# Patient Record
Sex: FEMALE | Race: WHITE | NOT HISPANIC OR LATINO | URBAN - METROPOLITAN AREA
[De-identification: names, ages, dates, MRNs, and addresses within clinical notes are randomized per-mention and may not be internally consistent; named-entity substitution may affect disease eponyms.]

---

## 2017-01-31 NOTE — ASU PATIENT PROFILE, ADULT - PSH
H/O vein stripping    H/O: lung cancer  Lobectomy  Status post cataract extraction  left eye done 10/5/2016

## 2017-01-31 NOTE — ASU PATIENT PROFILE, ADULT - PMH
Breast cyst    Graves disease    Hypothyroid    Migraines    OP (osteoporosis)    Raynauds disease    Venous insufficiency

## 2017-02-01 ENCOUNTER — OUTPATIENT (OUTPATIENT)
Dept: OUTPATIENT SERVICES | Facility: HOSPITAL | Age: 67
LOS: 1 days | End: 2017-02-01
Payer: MEDICARE

## 2017-02-01 ENCOUNTER — TRANSCRIPTION ENCOUNTER (OUTPATIENT)
Age: 67
End: 2017-02-01

## 2017-02-01 VITALS
RESPIRATION RATE: 16 BRPM | OXYGEN SATURATION: 98 % | HEART RATE: 60 BPM | SYSTOLIC BLOOD PRESSURE: 132 MMHG | DIASTOLIC BLOOD PRESSURE: 68 MMHG

## 2017-02-01 VITALS
HEART RATE: 59 BPM | RESPIRATION RATE: 12 BRPM | TEMPERATURE: 97 F | WEIGHT: 151.68 LBS | DIASTOLIC BLOOD PRESSURE: 74 MMHG | HEIGHT: 64 IN | OXYGEN SATURATION: 99 % | SYSTOLIC BLOOD PRESSURE: 128 MMHG

## 2017-02-01 DIAGNOSIS — Z98.49 CATARACT EXTRACTION STATUS, UNSPECIFIED EYE: Chronic | ICD-10-CM

## 2017-02-01 DIAGNOSIS — Z85.118 PERSONAL HISTORY OF OTHER MALIGNANT NEOPLASM OF BRONCHUS AND LUNG: Chronic | ICD-10-CM

## 2017-02-01 DIAGNOSIS — H25.11 AGE-RELATED NUCLEAR CATARACT, RIGHT EYE: ICD-10-CM

## 2017-02-01 DIAGNOSIS — Z98.89 OTHER SPECIFIED POSTPROCEDURAL STATES: Chronic | ICD-10-CM

## 2017-02-01 PROCEDURE — 66984 XCAPSL CTRC RMVL W/O ECP: CPT | Mod: RT

## 2017-02-01 PROCEDURE — V2788: CPT

## 2017-02-01 NOTE — ASU DISCHARGE PLAN (ADULT/PEDIATRIC). - PT EDUC
other (specify)/Implant card (specify)/Intraocular lens implant (IOL) , Eye shield instructions and eye kit given to patient

## 2017-02-01 NOTE — ASU DISCHARGE PLAN (ADULT/PEDIATRIC). - NOTIFY
Pain not relieved by Medications/Persistent Nausea and Vomiting/Swelling that continues/Bleeding that does not stop/Fever greater than 101

## 2017-12-07 ENCOUNTER — APPOINTMENT (OUTPATIENT)
Dept: THORACIC SURGERY | Facility: CLINIC | Age: 67
End: 2017-12-07
Payer: MEDICARE

## 2017-12-07 VITALS
DIASTOLIC BLOOD PRESSURE: 80 MMHG | RESPIRATION RATE: 16 BRPM | OXYGEN SATURATION: 95 % | SYSTOLIC BLOOD PRESSURE: 160 MMHG | HEIGHT: 64 IN | WEIGHT: 152 LBS | BODY MASS INDEX: 25.95 KG/M2 | HEART RATE: 78 BPM

## 2017-12-07 DIAGNOSIS — Z86.39 PERSONAL HISTORY OF OTHER ENDOCRINE, NUTRITIONAL AND METABOLIC DISEASE: ICD-10-CM

## 2017-12-07 DIAGNOSIS — Z87.39 PERSONAL HISTORY OF OTHER DISEASES OF THE MUSCULOSKELETAL SYSTEM AND CONNECTIVE TISSUE: ICD-10-CM

## 2017-12-07 DIAGNOSIS — Z87.19 PERSONAL HISTORY OF OTHER DISEASES OF THE DIGESTIVE SYSTEM: ICD-10-CM

## 2017-12-07 DIAGNOSIS — C34.90 MALIGNANT NEOPLASM OF UNSPECIFIED PART OF UNSPECIFIED BRONCHUS OR LUNG: ICD-10-CM

## 2017-12-07 PROCEDURE — 99205 OFFICE O/P NEW HI 60 MIN: CPT

## 2017-12-29 ENCOUNTER — OUTPATIENT (OUTPATIENT)
Dept: OUTPATIENT SERVICES | Facility: HOSPITAL | Age: 67
LOS: 1 days | End: 2017-12-29
Payer: MEDICARE

## 2017-12-29 VITALS
HEART RATE: 72 BPM | HEIGHT: 64.25 IN | OXYGEN SATURATION: 97 % | SYSTOLIC BLOOD PRESSURE: 138 MMHG | RESPIRATION RATE: 16 BRPM | DIASTOLIC BLOOD PRESSURE: 78 MMHG | TEMPERATURE: 98 F | WEIGHT: 154.1 LBS

## 2017-12-29 DIAGNOSIS — R91.1 SOLITARY PULMONARY NODULE: ICD-10-CM

## 2017-12-29 DIAGNOSIS — Z98.49 CATARACT EXTRACTION STATUS, UNSPECIFIED EYE: Chronic | ICD-10-CM

## 2017-12-29 DIAGNOSIS — Z98.89 OTHER SPECIFIED POSTPROCEDURAL STATES: Chronic | ICD-10-CM

## 2017-12-29 DIAGNOSIS — Z91.040 LATEX ALLERGY STATUS: ICD-10-CM

## 2017-12-29 DIAGNOSIS — I10 ESSENTIAL (PRIMARY) HYPERTENSION: ICD-10-CM

## 2017-12-29 DIAGNOSIS — E03.9 HYPOTHYROIDISM, UNSPECIFIED: ICD-10-CM

## 2017-12-29 DIAGNOSIS — Z85.118 PERSONAL HISTORY OF OTHER MALIGNANT NEOPLASM OF BRONCHUS AND LUNG: Chronic | ICD-10-CM

## 2017-12-29 DIAGNOSIS — Z86.008 PERSONAL HISTORY OF IN-SITU NEOPLASM OF OTHER SITE: Chronic | ICD-10-CM

## 2017-12-29 DIAGNOSIS — L73.2 HIDRADENITIS SUPPURATIVA: Chronic | ICD-10-CM

## 2017-12-29 LAB
ALBUMIN SERPL ELPH-MCNC: 4 G/DL — SIGNIFICANT CHANGE UP (ref 3.3–5)
ALP SERPL-CCNC: 42 U/L — SIGNIFICANT CHANGE UP (ref 40–120)
ALT FLD-CCNC: 20 U/L — SIGNIFICANT CHANGE UP (ref 4–33)
AST SERPL-CCNC: 24 U/L — SIGNIFICANT CHANGE UP (ref 4–32)
BILIRUB SERPL-MCNC: 0.4 MG/DL — SIGNIFICANT CHANGE UP (ref 0.2–1.2)
BLD GP AB SCN SERPL QL: NEGATIVE — SIGNIFICANT CHANGE UP
BUN SERPL-MCNC: 18 MG/DL — SIGNIFICANT CHANGE UP (ref 7–23)
CALCIUM SERPL-MCNC: 8.7 MG/DL — SIGNIFICANT CHANGE UP (ref 8.4–10.5)
CHLORIDE SERPL-SCNC: 101 MMOL/L — SIGNIFICANT CHANGE UP (ref 98–107)
CO2 SERPL-SCNC: 28 MMOL/L — SIGNIFICANT CHANGE UP (ref 22–31)
CREAT SERPL-MCNC: 0.86 MG/DL — SIGNIFICANT CHANGE UP (ref 0.5–1.3)
GLUCOSE SERPL-MCNC: 79 MG/DL — SIGNIFICANT CHANGE UP (ref 70–99)
HCT VFR BLD CALC: 37.4 % — SIGNIFICANT CHANGE UP (ref 34.5–45)
HGB BLD-MCNC: 12.2 G/DL — SIGNIFICANT CHANGE UP (ref 11.5–15.5)
MCHC RBC-ENTMCNC: 30.5 PG — SIGNIFICANT CHANGE UP (ref 27–34)
MCHC RBC-ENTMCNC: 32.6 % — SIGNIFICANT CHANGE UP (ref 32–36)
MCV RBC AUTO: 93.5 FL — SIGNIFICANT CHANGE UP (ref 80–100)
NRBC # FLD: 0 — SIGNIFICANT CHANGE UP
PLATELET # BLD AUTO: 308 K/UL — SIGNIFICANT CHANGE UP (ref 150–400)
PMV BLD: 8.9 FL — SIGNIFICANT CHANGE UP (ref 7–13)
POTASSIUM SERPL-MCNC: 3.7 MMOL/L — SIGNIFICANT CHANGE UP (ref 3.5–5.3)
POTASSIUM SERPL-SCNC: 3.7 MMOL/L — SIGNIFICANT CHANGE UP (ref 3.5–5.3)
PROT SERPL-MCNC: 7 G/DL — SIGNIFICANT CHANGE UP (ref 6–8.3)
RBC # BLD: 4 M/UL — SIGNIFICANT CHANGE UP (ref 3.8–5.2)
RBC # FLD: 13.7 % — SIGNIFICANT CHANGE UP (ref 10.3–14.5)
RH IG SCN BLD-IMP: POSITIVE — SIGNIFICANT CHANGE UP
SODIUM SERPL-SCNC: 139 MMOL/L — SIGNIFICANT CHANGE UP (ref 135–145)
WBC # BLD: 5.3 K/UL — SIGNIFICANT CHANGE UP (ref 3.8–10.5)
WBC # FLD AUTO: 5.3 K/UL — SIGNIFICANT CHANGE UP (ref 3.8–10.5)

## 2017-12-29 PROCEDURE — 93010 ELECTROCARDIOGRAM REPORT: CPT

## 2017-12-29 RX ORDER — LEVOTHYROXINE SODIUM 125 MCG
1 TABLET ORAL
Qty: 0 | Refills: 0 | COMMUNITY

## 2017-12-29 NOTE — H&P PST ADULT - ENDOCRINE COMMENTS
Denies DM. Hx of hypothyroidism, s/p radioactive ablation for grave's disease, reports stable TSH levels Denies DM. Hx of hypothyroidism, s/p radioactive iodine for grave's disease, reports stable TSH levels

## 2017-12-29 NOTE — H&P PST ADULT - NEGATIVE GENERAL SYMPTOMS
no weight loss/no polyphagia/no polyuria/no fatigue/no fever/no chills/no sweating/no anorexia/no weight gain/no malaise

## 2017-12-29 NOTE — H&P PST ADULT - NEGATIVE NEUROLOGICAL SYMPTOMS
no vertigo/no paresthesias/no confusion/no transient paralysis/no weakness/no headache/no generalized seizures/no focal seizures/no loss of consciousness/no hemiparesis/no facial palsy/no syncope/no tremors/no loss of sensation/no difficulty walking

## 2017-12-29 NOTE — H&P PST ADULT - NEGATIVE CARDIOVASCULAR SYMPTOMS
no palpitations/no paroxysmal nocturnal dyspnea/no orthopnea/no peripheral edema/no claudication/no chest pain/no dyspnea on exertion

## 2017-12-29 NOTE — H&P PST ADULT - RS GEN PE MLT RESP DETAILS PC
airway patent/no chest wall tenderness/no intercostal retractions/clear to auscultation bilaterally/no rales/no rhonchi/no subcutaneous emphysema/respirations non-labored/breath sounds equal/no wheezes/good air movement

## 2017-12-29 NOTE — H&P PST ADULT - NEGATIVE OPHTHALMOLOGIC SYMPTOMS
no loss of vision L/no pain L/no diplopia/no loss of vision R/no blurred vision R/no blurred vision L/no discharge R/no discharge L/no pain R

## 2017-12-29 NOTE — H&P PST ADULT - NEGATIVE GENERAL GENITOURINARY SYMPTOMS
no hematuria/no renal colic/no nocturia/no dysuria/normal urinary frequency/no bladder infections/no incontinence

## 2017-12-29 NOTE — H&P PST ADULT - HISTORY OF PRESENT ILLNESS
solitary pulmonary nodule 67 year old female presents to presurgical testing with diagnosis of solitary pulmonary nodule scheduled for right video assisted thoracoscopy, right lower lobectomy, mediastinal lymph node dissection for 1/8/18. Pt with Hx of lung cancer s/p left lower lobectomy in 2000, found a new right upper lobe nodule on imaging, referred for surgical evaluation. Pt denies weight loss, cough, SOB, hemoptysis, of fever.

## 2017-12-29 NOTE — H&P PST ADULT - PROBLEM SELECTOR PLAN 1
Pt is scheduled for right video assisted thoracoscopy, right lower lobectomy, mediastinal lymph node dissection for 1/8/18. Preop instructions, surgical scrub provided. Pt will take own pantoprazole on the morning of procedure with a sip of water. Pending medical evaluation and recent stress test. Pt completed appts.

## 2017-12-29 NOTE — H&P PST ADULT - PMH
Breast cyst  benign  GERD (gastroesophageal reflux disease)    Graves disease  s/p radioactive iodine ~10 years ago  Hypertension    Hypothyroid    Lung cancer    Migraines    OP (osteoporosis)    Raynauds disease    Solitary pulmonary nodule    Venous insufficiency

## 2017-12-29 NOTE — H&P PST ADULT - NEGATIVE ENMT SYMPTOMS
no nose bleeds/no hearing difficulty/no ear pain/no tinnitus/no throat pain/no dysphagia/no vertigo/no sinus symptoms/no nasal congestion

## 2017-12-29 NOTE — H&P PST ADULT - FAMILY HISTORY
Mother  Still living? Yes, Estimated age: Age Unknown  Family history of heart disease, Age at diagnosis: Age Unknown

## 2017-12-29 NOTE — H&P PST ADULT - PSH
H/O melanoma in situ  excision from left wrist  H/O vein stripping  2007, 2008, 2009  H/O: lung cancer  Left Lower Lobectomy 2000  Hydradenitis  excision, ~17 years ago  Status post cataract extraction  left eye done 10/5/2016 1/2017

## 2018-01-08 ENCOUNTER — INPATIENT (INPATIENT)
Facility: HOSPITAL | Age: 68
LOS: 2 days | Discharge: HOME | End: 2018-01-11
Attending: THORACIC SURGERY (CARDIOTHORACIC VASCULAR SURGERY) | Admitting: THORACIC SURGERY (CARDIOTHORACIC VASCULAR SURGERY)
Payer: MEDICARE

## 2018-01-08 ENCOUNTER — RESULT REVIEW (OUTPATIENT)
Age: 68
End: 2018-01-08

## 2018-01-08 ENCOUNTER — APPOINTMENT (OUTPATIENT)
Dept: THORACIC SURGERY | Facility: HOSPITAL | Age: 68
End: 2018-01-08

## 2018-01-08 ENCOUNTER — TRANSCRIPTION ENCOUNTER (OUTPATIENT)
Age: 68
End: 2018-01-08

## 2018-01-08 VITALS
SYSTOLIC BLOOD PRESSURE: 120 MMHG | HEART RATE: 62 BPM | RESPIRATION RATE: 16 BRPM | TEMPERATURE: 98 F | DIASTOLIC BLOOD PRESSURE: 71 MMHG | WEIGHT: 154.1 LBS | OXYGEN SATURATION: 98 % | HEIGHT: 64.25 IN

## 2018-01-08 DIAGNOSIS — Z86.008 PERSONAL HISTORY OF IN-SITU NEOPLASM OF OTHER SITE: Chronic | ICD-10-CM

## 2018-01-08 DIAGNOSIS — Z98.89 OTHER SPECIFIED POSTPROCEDURAL STATES: Chronic | ICD-10-CM

## 2018-01-08 DIAGNOSIS — L73.2 HIDRADENITIS SUPPURATIVA: Chronic | ICD-10-CM

## 2018-01-08 DIAGNOSIS — Z98.49 CATARACT EXTRACTION STATUS, UNSPECIFIED EYE: Chronic | ICD-10-CM

## 2018-01-08 DIAGNOSIS — R91.1 SOLITARY PULMONARY NODULE: ICD-10-CM

## 2018-01-08 DIAGNOSIS — Z85.118 PERSONAL HISTORY OF OTHER MALIGNANT NEOPLASM OF BRONCHUS AND LUNG: Chronic | ICD-10-CM

## 2018-01-08 LAB — RH IG SCN BLD-IMP: POSITIVE — SIGNIFICANT CHANGE UP

## 2018-01-08 PROCEDURE — 71045 X-RAY EXAM CHEST 1 VIEW: CPT | Mod: 26

## 2018-01-08 PROCEDURE — 88331 PATH CONSLTJ SURG 1 BLK 1SPC: CPT | Mod: 26

## 2018-01-08 PROCEDURE — 88309 TISSUE EXAM BY PATHOLOGIST: CPT | Mod: 26

## 2018-01-08 PROCEDURE — 99233 SBSQ HOSP IP/OBS HIGH 50: CPT

## 2018-01-08 PROCEDURE — 88305 TISSUE EXAM BY PATHOLOGIST: CPT | Mod: 26

## 2018-01-08 PROCEDURE — 32674 THORACOSCOPY LYMPH NODE EXC: CPT

## 2018-01-08 PROCEDURE — 32663 THORACOSCOPY W/LOBECTOMY: CPT

## 2018-01-08 RX ORDER — ACETAMINOPHEN 500 MG
1000 TABLET ORAL ONCE
Qty: 0 | Refills: 0 | Status: COMPLETED | OUTPATIENT
Start: 2018-01-08 | End: 2018-01-09

## 2018-01-08 RX ORDER — DENOSUMAB 60 MG/ML
60 INJECTION SUBCUTANEOUS
Qty: 0 | Refills: 0 | COMMUNITY

## 2018-01-08 RX ORDER — HYDROMORPHONE HYDROCHLORIDE 2 MG/ML
0.5 INJECTION INTRAMUSCULAR; INTRAVENOUS; SUBCUTANEOUS
Qty: 0 | Refills: 0 | Status: DISCONTINUED | OUTPATIENT
Start: 2018-01-08 | End: 2018-01-09

## 2018-01-08 RX ORDER — SODIUM CHLORIDE 9 MG/ML
1000 INJECTION, SOLUTION INTRAVENOUS
Qty: 0 | Refills: 0 | Status: DISCONTINUED | OUTPATIENT
Start: 2018-01-08 | End: 2018-01-09

## 2018-01-08 RX ORDER — HEPARIN SODIUM 5000 [USP'U]/ML
5000 INJECTION INTRAVENOUS; SUBCUTANEOUS ONCE
Qty: 0 | Refills: 0 | Status: COMPLETED | OUTPATIENT
Start: 2018-01-08 | End: 2018-01-08

## 2018-01-08 RX ORDER — SODIUM CHLORIDE 9 MG/ML
1000 INJECTION, SOLUTION INTRAVENOUS
Qty: 0 | Refills: 0 | Status: DISCONTINUED | OUTPATIENT
Start: 2018-01-08 | End: 2018-01-08

## 2018-01-08 RX ORDER — HEPARIN SODIUM 5000 [USP'U]/ML
5000 INJECTION INTRAVENOUS; SUBCUTANEOUS EVERY 8 HOURS
Qty: 0 | Refills: 0 | Status: DISCONTINUED | OUTPATIENT
Start: 2018-01-08 | End: 2018-01-11

## 2018-01-08 RX ORDER — ASPIRIN/CALCIUM CARB/MAGNESIUM 324 MG
81 TABLET ORAL DAILY
Qty: 0 | Refills: 0 | Status: DISCONTINUED | OUTPATIENT
Start: 2018-01-09 | End: 2018-01-11

## 2018-01-08 RX ORDER — LEVOTHYROXINE SODIUM 125 MCG
1 TABLET ORAL
Qty: 0 | Refills: 0 | COMMUNITY

## 2018-01-08 RX ORDER — HYDROCHLOROTHIAZIDE 25 MG
12.5 TABLET ORAL DAILY
Qty: 0 | Refills: 0 | Status: DISCONTINUED | OUTPATIENT
Start: 2018-01-08 | End: 2018-01-11

## 2018-01-08 RX ORDER — PANTOPRAZOLE SODIUM 20 MG/1
1 TABLET, DELAYED RELEASE ORAL
Qty: 0 | Refills: 0 | COMMUNITY

## 2018-01-08 RX ORDER — LEVOTHYROXINE SODIUM 125 MCG
88 TABLET ORAL EVERY OTHER DAY
Qty: 0 | Refills: 0 | Status: DISCONTINUED | OUTPATIENT
Start: 2018-01-10 | End: 2018-01-11

## 2018-01-08 RX ORDER — DOCUSATE SODIUM 100 MG
100 CAPSULE ORAL THREE TIMES A DAY
Qty: 0 | Refills: 0 | Status: DISCONTINUED | OUTPATIENT
Start: 2018-01-08 | End: 2018-01-11

## 2018-01-08 RX ORDER — PANTOPRAZOLE SODIUM 20 MG/1
40 TABLET, DELAYED RELEASE ORAL
Qty: 0 | Refills: 0 | Status: DISCONTINUED | OUTPATIENT
Start: 2018-01-08 | End: 2018-01-11

## 2018-01-08 RX ORDER — ONDANSETRON 8 MG/1
4 TABLET, FILM COATED ORAL EVERY 6 HOURS
Qty: 0 | Refills: 0 | Status: DISCONTINUED | OUTPATIENT
Start: 2018-01-08 | End: 2018-01-11

## 2018-01-08 RX ORDER — HYDROMORPHONE HYDROCHLORIDE 2 MG/ML
30 INJECTION INTRAMUSCULAR; INTRAVENOUS; SUBCUTANEOUS
Qty: 0 | Refills: 0 | Status: DISCONTINUED | OUTPATIENT
Start: 2018-01-08 | End: 2018-01-09

## 2018-01-08 RX ORDER — SODIUM CHLORIDE 9 MG/ML
250 INJECTION, SOLUTION INTRAVENOUS ONCE
Qty: 0 | Refills: 0 | Status: COMPLETED | OUTPATIENT
Start: 2018-01-08 | End: 2018-01-08

## 2018-01-08 RX ORDER — LEVOTHYROXINE SODIUM 125 MCG
75 TABLET ORAL EVERY OTHER DAY
Qty: 0 | Refills: 0 | Status: DISCONTINUED | OUTPATIENT
Start: 2018-01-09 | End: 2018-01-11

## 2018-01-08 RX ORDER — LOSARTAN POTASSIUM 100 MG/1
1 TABLET, FILM COATED ORAL
Qty: 0 | Refills: 0 | COMMUNITY

## 2018-01-08 RX ORDER — NALOXONE HYDROCHLORIDE 4 MG/.1ML
0.1 SPRAY NASAL
Qty: 0 | Refills: 0 | Status: DISCONTINUED | OUTPATIENT
Start: 2018-01-08 | End: 2018-01-11

## 2018-01-08 RX ORDER — LOSARTAN POTASSIUM 100 MG/1
25 TABLET, FILM COATED ORAL DAILY
Qty: 0 | Refills: 0 | Status: DISCONTINUED | OUTPATIENT
Start: 2018-01-08 | End: 2018-01-11

## 2018-01-08 RX ADMIN — SODIUM CHLORIDE 30 MILLILITER(S): 9 INJECTION, SOLUTION INTRAVENOUS at 11:36

## 2018-01-08 RX ADMIN — SODIUM CHLORIDE 30 MILLILITER(S): 9 INJECTION, SOLUTION INTRAVENOUS at 19:58

## 2018-01-08 RX ADMIN — HEPARIN SODIUM 5000 UNIT(S): 5000 INJECTION INTRAVENOUS; SUBCUTANEOUS at 11:36

## 2018-01-08 RX ADMIN — HYDROMORPHONE HYDROCHLORIDE 30 MILLILITER(S): 2 INJECTION INTRAMUSCULAR; INTRAVENOUS; SUBCUTANEOUS at 17:27

## 2018-01-08 RX ADMIN — SODIUM CHLORIDE 1000 MILLILITER(S): 9 INJECTION, SOLUTION INTRAVENOUS at 21:00

## 2018-01-08 RX ADMIN — Medication 1 DROP(S): at 21:25

## 2018-01-08 RX ADMIN — HEPARIN SODIUM 5000 UNIT(S): 5000 INJECTION INTRAVENOUS; SUBCUTANEOUS at 21:24

## 2018-01-08 RX ADMIN — HYDROMORPHONE HYDROCHLORIDE 30 MILLILITER(S): 2 INJECTION INTRAMUSCULAR; INTRAVENOUS; SUBCUTANEOUS at 19:22

## 2018-01-08 RX ADMIN — Medication 100 MILLIGRAM(S): at 21:24

## 2018-01-08 RX ADMIN — SODIUM CHLORIDE 1000 MILLILITER(S): 9 INJECTION, SOLUTION INTRAVENOUS at 22:22

## 2018-01-08 NOTE — BRIEF OPERATIVE NOTE - PROCEDURE
<<-----Click on this checkbox to enter Procedure Mediastinal lymphadenectomy  01/08/2018    Active  [id number]  Lobectomy of right lung with VATS  01/08/2018  RLL  Active  [id number]

## 2018-01-08 NOTE — PROGRESS NOTE ADULT - SUBJECTIVE AND OBJECTIVE BOX
JOY BAILON          MRN-[id number]    HPI:  67 year old female presents to presurgical testing with diagnosis of solitary pulmonary nodule scheduled for right video assisted thoracoscopy, right lower lobectomy, mediastinal lymph node dissection for 18. Pt with Hx of lung cancer s/p left lower lobectomy in , found a new right upper lobe nodule on imaging, referred for surgical evaluation. Pt denies weight loss, cough, SOB, hemoptysis, of fever. (29 Dec 2017 15:18)      Procedure:  POD # :     Issues:        Interval/Overnight Events/ ROS  Pt remained hemodynamically stable overnight, not on any pressors or inotropes. OOB to chair, breathing comfortably with minimal pain. Ambulated several times . Denies pain, no SOB, no palpitations, no nausea/ no vomiting, no dizziness  A-line and oquendo d/obdulia         PAST MEDICAL & SURGICAL HISTORY:  Lung cancer  GERD (gastroesophageal reflux disease)  Solitary pulmonary nodule  Hypertension  Raynauds disease  Hypothyroid  Breast cyst: benign  Graves disease: s/p radioactive iodine ~10 years ago  OP (osteoporosis)  Migraines  Venous insufficiency  Hydradenitis: excision, ~17 years ago  H/O melanoma in situ: excision from left wrist  Status post cataract extraction: left eye done 10/5/2016 2017  H/O vein strippin, ,   H/O: lung cancer: Left Lower Lobectomy     Allergies    latex (Rash)  metal, nickel - rash, hives (Other)  No Known Drug Allergies    Intolerances            ***VITAL SIGNS:  Vital Signs Last 24 Hrs  T(C): 34.8 (2018 17:00), Max: 36.6 (2018 11:09)  T(F): 94.7 (2018 17:00), Max: 97.8 (2018 11:09)  HR: 50 (2018 18:00) (50 - 68)  BP: 120/71 (2018 11:09) (120/71 - 120/71)  BP(mean): --  RR: 16 (2018 18:00) (15 - 21)  SpO2: 99% (2018 18:00) (95% - 99%)    I/Os:   I&O's Detail    2018 07:01  -  2018 18:05  --------------------------------------------------------  IN:    lactated ringers.: 60 mL  Total IN: 60 mL    OUT:    Chest Tube: 15 mL    Indwelling Catheter - Urethral: 30 mL  Total OUT: 45 mL    Total NET: 15 mL          CAPILLARY BLOOD GLUCOSE          =======================  MEDICATIONS  ===================  MEDICATIONS  (STANDING):  docusate sodium 100 milliGRAM(s) Oral three times a day  heparin  Injectable 5000 Unit(s) SubCutaneous every 8 hours  hydrochlorothiazide 12.5 milliGRAM(s) Oral daily  HYDROmorphone PCA (1 mG/mL) 30 milliLiter(s) PCA Continuous PCA Continuous  lactated ringers. 1000 milliLiter(s) (30 mL/Hr) IV Continuous <Continuous>  losartan 25 milliGRAM(s) Oral daily  pantoprazole    Tablet 40 milliGRAM(s) Oral before breakfast    MEDICATIONS  (PRN):  artificial  tears Solution 1 Drop(s) Both EYES four times a day PRN Dry Eyes  HYDROmorphone PCA (1 mG/mL) Rescue Clinician Bolus 0.5 milliGRAM(s) IV Push every 15 minutes PRN for Pain Scale GREATER THAN 6  naloxone Injectable 0.1 milliGRAM(s) IV Push every 3 minutes PRN For ANY of the following changes in patient status:  A. RR LESS THAN 10 breaths per minute, B. Oxygen saturation LESS THAN 90%, C. Sedation score of 6  ondansetron Injectable 4 milliGRAM(s) IV Push every 6 hours PRN Nausea      ======================VENTILATOR SETTINGS  ==============      =================== PATIENT CARE ACCESS DEVICES ==========  Peripheral IV  Central Venous Line	R	L	IJ	Fem	SC			Placed:   Arterial Line	R	L	PT	DP	Fem	Rad	Ax	Placed:   Midline:				  Urinary Catheter, Date Placed:   Necessity of urinary, arterial, and venous catheters discussed    ======================= PHYSICAL EXAM===================  General:                         Comfortable, Awake, alert, not in any distress  Neuro:                            Moving all extremities to commands. No focal deficits	  HEENT:                           TANK/ ETT/ NGT/ trach  Respiratory:	Lungs clear on auscultation bilaterally with good aeration.                                           No rales, rhonchi, no wheezing. Effort even and unlabored.  CV:		Regular rate and rhythm. Normal S1/S2. No murmurs  Abdomen:	                     Soft,  nontender, not-distended. Bowel sounds present / absent.   Skin:		No rash.  Extremities:	Warm, no cyanosis or edema.  Palpable pulses    ============================ LABS =======================                      ===================== IMAGING STUDIES ===================  Radiology personally reviewed.    ====================ASSESSMENT AND PLAN ================      ====================== NEUROLOGY=======================  Pain control with PCA / PCEA / Tylenol IV / Toradol / Percocet  Pt is on Precedex for agitation  Pt is sedated with Propofol / Fentanyl    ==================== RESPIRATORY========================  Pt is on            L nasal canula / Face tent____% FiO2  Comfortable, no evidence of distress.  Using incentive spirometry & doing                ml  Monitor chest tube output  Chest tube to suction / water seal	    Mechanical Ventilation:    Mechanical ventilator status assessed & settings reviewed  Continue bronchodilators, pulmonary toilet  Head of bed elevation to 30-40 degrees    ====================CARDIOVASCULAR=====================  Continue hemodynamic monitoring/ telemetry  Not on any pressors  Continue cardiovascular / antihypertensive medications    ===================== RENAL ============================  Continue LR 30CC/hr      D/C IVF  Monitor I/Os, BUN/ Cr  and electrolytes  D/C Oquendo      Keep Oquendo  BPH: Continue Flomax/ Finasteride      ==================== GASTROINTESTINAL===================  On regular diet, tolerating well  Continue GI prophylaxis with Pepcid / Protonix  Continue Zofran / Reglan for nausea - PRN	  NPO    =======================    ENDOCRIN  =====================  Glycemic monitoring  F/S with coverage  ===================HEMATOLOGIC/ONCOLOGIC =============  Monitor chest tube output. No signs of active bleeding.   Follow CBC, coags  in AM  DVT prophylaxis with SCD, sc Heparin    ========================INFECTIOUS DISEASE===============  No signs of infection. Monitor for fever / leukocytosis.  All surgical incision / chest tube  sites look clean  D/C Oquendo      Pertinent clinical, laboratory, radiographic, hemodynamic, echocardiographic, respiratory data, microbiologic data and chart were reviewed and analyzed frequently throughout the course of the day and night. GI and DVT prophylaxis, glycemic control, head of bed elevation and skin care issues were addressed.  Patient seen, examined and plan discussed with CT Surgery / CTICU team during rounds.  Pt remains critically ill in imminent risk of  deterioration and requires very careful cardio- pulmonary monitoring and support.    I have spent               minutes of critical care time with this pt between            am/pm    and               am/ pm         minutes spent on total encounter; more than 50% of the visit was spent counseling and/or coordinating care by the attending physician.        COURTNEY Miller MD JOY BAILON          [id number]    HPI:  67 year old female  with diagnosis of solitary pulmonary nodule scheduled for right video assisted thoracoscopy, right lower lobectomy, mediastinal lymph node dissection for 18. Pt with Hx of lung cancer s/p left lower lobectomy in , found a new right lower lobe nodule on imaging, referred for surgical evaluation. Pt denies weight loss, cough, SOB, hemoptysis, of fever. (29 Dec 2017 15:18)      Procedure: 18  Right lower lobectomy with mediastinal lymph node dissection  POD # : 0    Issues: postop pain             right chest tube in place        Interval/OR Events/ ROS  S/p uneventful surgery - Pt extubated postop in the OR. On arrival in ICU - awake; c/o moderate pain at chest tube site. No SOB, no palpitations        PAST MEDICAL & SURGICAL HISTORY:  Lung cancer  GERD (gastroesophageal reflux disease)  Solitary pulmonary nodule  Hypertension  Raynauds disease  Hypothyroid  Breast cyst: benign  Graves disease: s/p radioactive iodine ~10 years ago  OP (osteoporosis)  Migraines  Venous insufficiency  Hydradenitis: excision, ~17 years ago  H/O melanoma in situ: excision from left wrist  Status post cataract extraction: left eye done 10/5/2016 2017  H/O vein strippin, ,   H/O: lung cancer: Left Lower Lobectomy     Allergies  latex (Rash)  metal, nickel - rash, hives (Other)  No Known Drug Allergies      ***VITAL SIGNS:  Vital Signs Last 24 Hrs  T(C): 34.8 (2018 17:00), Max: 36.6 (2018 11:09)  T(F): 94.7 (2018 17:00), Max: 97.8 (2018 11:09)  HR: 50 (2018 18:00) (50 - 68)  BP: 120/71 (2018 11:09) (120/71 - 120/71)  BP(mean): --  RR: 16 (2018 18:00) (15 - 21)  SpO2: 99% (2018 18:00) (95% - 99%)    I/Os:   I&O's Detail    2018 07:01  -  2018 18:05  --------------------------------------------------------  IN:    lactated ringers.: 60 mL  Total IN: 60 mL    OUT:    Chest Tube: 15 mL    Indwelling Catheter - Urethral: 30 mL  Total OUT: 45 mL    Total NET: 15 mL      =======================  MEDICATIONS  ===================  MEDICATIONS  (STANDING):  docusate sodium 100 milliGRAM(s) Oral three times a day  heparin  Injectable 5000 Unit(s) SubCutaneous every 8 hours  hydrochlorothiazide 12.5 milliGRAM(s) Oral daily  HYDROmorphone PCA (1 mG/mL) 30 milliLiter(s) PCA Continuous   lactated ringers. 1000 milliLiter(s) (30 mL/Hr) IV Continuous   losartan 25 milliGRAM(s) Oral daily  pantoprazole    Tablet 40 milliGRAM(s) Oral before breakfast    MEDICATIONS  (PRN):  artificial  tears Solution 1 Drop(s) Both EYES four times a day PRN Dry Eyes  HYDROmorphone PCA (1 mG/mL) Rescue Clinician Bolus 0.5 milliGRAM(s) IV Push every 15 minutes PRN for Pain Scale GREATER THAN 6  naloxone Injectable 0.1 milliGRAM(s) IV Push every 3 minutes PRN For ANY of the following changes in patient status:  A. RR LESS THAN 10 breaths per minute, B. Oxygen saturation LESS THAN 90%, C. Sedation score of 6  ondansetron Injectable 4 milliGRAM(s) IV Push every 6 hours PRN Nausea      =================== PATIENT CARE ACCESS DEVICES ==========  Peripheral IV (+)  Central Venous Line	R	L	IJ	Fem	SC			Placed:   Arterial Line	R	L	PT	DP	Fem	Rad	Ax	Placed:   				  Urinary Catheter, Date Placed: 18  Necessity of urinary, arterial, and venous catheters discussed    ======================= PHYSICAL EXAM===================  General:                         Comfortable, Awake, alert, not in any distress  Neuro:                            Moving all extremities to commands. No focal deficits	  HEENT:                           TANK/ ETT/ NGT/ trach  Respiratory:	Lungs clear on auscultation bilaterally with good aeration.                                           No rales, rhonchi, no wheezing. Effort even and unlabored.  CV:		Regular rate and rhythm. Normal S1/S2. No murmurs  Abdomen:	                     Soft,  nontender, not-distended. Bowel sounds present / absent.   Skin:		No rash.  Extremities:	Warm, no cyanosis or edema.  Palpable pulses    ============================ LABS =======================                      ===================== IMAGING STUDIES ===================  Radiology personally reviewed.    ====================ASSESSMENT AND PLAN ================      ====================== NEUROLOGY=======================  Pain control with PCA / PCEA / Tylenol IV / Toradol / Percocet  Pt is on Precedex for agitation  Pt is sedated with Propofol / Fentanyl    ==================== RESPIRATORY========================  Pt is on            L nasal canula / Face tent____% FiO2  Comfortable, no evidence of distress.  Using incentive spirometry & doing                ml  Monitor chest tube output  Chest tube to suction / water seal	    Mechanical Ventilation:    Mechanical ventilator status assessed & settings reviewed  Continue bronchodilators, pulmonary toilet  Head of bed elevation to 30-40 degrees    ====================CARDIOVASCULAR=====================  Continue hemodynamic monitoring/ telemetry  Not on any pressors  Continue cardiovascular / antihypertensive medications    ===================== RENAL ============================  Continue LR 30CC/hr      D/C IVF  Monitor I/Os, BUN/ Cr  and electrolytes  D/C Moraes      Keep Moraes  BPH: Continue Flomax/ Finasteride      ==================== GASTROINTESTINAL===================  On regular diet, tolerating well  Continue GI prophylaxis with Pepcid / Protonix  Continue Zofran / Reglan for nausea - PRN	  NPO    =======================    ENDOCRIN  =====================  Glycemic monitoring  F/S with coverage  ===================HEMATOLOGIC/ONCOLOGIC =============  Monitor chest tube output. No signs of active bleeding.   Follow CBC, coags  in AM  DVT prophylaxis with SCD, sc Heparin    ========================INFECTIOUS DISEASE===============  No signs of infection. Monitor for fever / leukocytosis.  All surgical incision / chest tube  sites look clean  D/C Moraes      Pertinent clinical, laboratory, radiographic, hemodynamic, echocardiographic, respiratory data, microbiologic data and chart were reviewed and analyzed frequently throughout the course of the day and night. GI and DVT prophylaxis, glycemic control, head of bed elevation and skin care issues were addressed.  Patient seen, examined and plan discussed with CT Surgery / CTICU team during rounds.  Pt remains critically ill in imminent risk of  deterioration and requires very careful cardio- pulmonary monitoring and support.    I have spent               minutes of critical care time with this pt between            am/pm    and               am/ pm         minutes spent on total encounter; more than 50% of the visit was spent counseling and/or coordinating care by the attending physician.        COURTNEY Miller MD JOY BAILON          MRN-[id number]    HPI:  67 year old female  with diagnosis of solitary pulmonary nodule scheduled for right video assisted thoracoscopy, right lower lobectomy, mediastinal lymph node dissection for 18. Pt with Hx of lung cancer s/p left lower lobectomy in , found a new right lower lobe nodule on imaging, referred for surgical evaluation. Pt denies weight loss, cough, SOB, hemoptysis, of fever. (29 Dec 2017 15:18)      Procedure: 18  Right lower lobectomy with mediastinal lymph node dissection  POD # : 0    Issues: postop pain             right chest tube in place        Interval/OR Events/ ROS  S/p uneventful surgery - Pt extubated postop in the OR. On arrival in ICU - awake; c/o moderate pain at chest tube site. No SOB, no palpitations        PAST MEDICAL & SURGICAL HISTORY:  Lung cancer  GERD (gastroesophageal reflux disease)  Solitary pulmonary nodule  Hypertension  Raynauds disease  Hypothyroid  Breast cyst: benign  Graves disease: s/p radioactive iodine ~10 years ago  OP (osteoporosis)  Migraines  Venous insufficiency  Hydradenitis: excision, ~17 years ago  H/O melanoma in situ: excision from left wrist  Status post cataract extraction: left eye done 10/5/2016 2017  H/O vein strippin, ,   H/O: lung cancer: Left Lower Lobectomy     Allergies  latex (Rash)  metal, nickel - rash, hives (Other)  No Known Drug Allergies      ***VITAL SIGNS:  Vital Signs Last 24 Hrs  T(C): 34.8 (2018 17:00), Max: 36.6 (2018 11:09)  T(F): 94.7 (2018 17:00), Max: 97.8 (2018 11:09)  HR: 50 (2018 18:00) (50 - 68)  BP: 120/71 (2018 11:09) (120/71 - 120/71)  BP(mean): --  RR: 16 (2018 18:00) (15 - 21)  SpO2: 99% (2018 18:00) (95% - 99%)    I/Os:   I&O's Detail    2018 07:01  -  2018 18:05  --------------------------------------------------------  IN:    lactated ringers.: 60 mL  Total IN: 60 mL    OUT:    Chest Tube: 15 mL    Indwelling Catheter - Urethral: 30 mL  Total OUT: 45 mL    Total NET: 15 mL      =======================  MEDICATIONS  ===================  MEDICATIONS  (STANDING):  docusate sodium 100 milliGRAM(s) Oral three times a day  heparin  Injectable 5000 Unit(s) SubCutaneous every 8 hours  hydrochlorothiazide 12.5 milliGRAM(s) Oral daily  HYDROmorphone PCA (1 mG/mL) 30 milliLiter(s) PCA Continuous   lactated ringers. 1000 milliLiter(s) (30 mL/Hr) IV Continuous   losartan 25 milliGRAM(s) Oral daily  pantoprazole    Tablet 40 milliGRAM(s) Oral before breakfast    MEDICATIONS  (PRN):  artificial  tears Solution 1 Drop(s) Both EYES four times a day PRN Dry Eyes  HYDROmorphone PCA (1 mG/mL) Rescue Clinician Bolus 0.5 milliGRAM(s) IV Push every 15 minutes PRN for Pain Scale GREATER THAN 6  naloxone Injectable 0.1 milliGRAM(s) IV Push every 3 minutes PRN For ANY of the following changes in patient status:  A. RR LESS THAN 10 breaths per minute, B. Oxygen saturation LESS THAN 90%, C. Sedation score of 6  ondansetron Injectable 4 milliGRAM(s) IV Push every 6 hours PRN Nausea      =================== PATIENT CARE ACCESS DEVICES ==========  Peripheral IV (+)  Arterial Line	L/ 	Rad Placed: 18				  Urinary Catheter, Date Placed: 18  Necessity of urinary, arterial, and venous catheters discussed    ======================= PHYSICAL EXAM===================  General:            Comfortable, Awake, alert, not in any distress  Neuro:               Moving all extremities to commands. No focal deficits	  HEENT:                           TANK/ ETT/ NGT/ trach  Respiratory:	Lungs clear on auscultation bilaterally with good aeration.                                           No rales, rhonchi, no wheezing. Effort even and unlabored.  CV:		Regular rate and rhythm. Normal S1/S2. No murmurs  Abdomen:	                     Soft,  nontender, not-distended. Bowel sounds present / absent.   Skin:		No rash.  Extremities:	Warm, no cyanosis or edema.  Palpable pulses    ============================ LABS =======================                      ===================== IMAGING STUDIES ===================  Radiology personally reviewed.    ====================ASSESSMENT AND PLAN ================      ====================== NEUROLOGY=======================  Pain control with PCA / PCEA / Tylenol IV / Toradol / Percocet  Pt is on Precedex for agitation  Pt is sedated with Propofol / Fentanyl    ==================== RESPIRATORY========================  Pt is on            L nasal canula / Face tent____% FiO2  Comfortable, no evidence of distress.  Using incentive spirometry & doing                ml  Monitor chest tube output  Chest tube to suction / water seal	    Mechanical Ventilation:    Mechanical ventilator status assessed & settings reviewed  Continue bronchodilators, pulmonary toilet  Head of bed elevation to 30-40 degrees    ====================CARDIOVASCULAR=====================  Continue hemodynamic monitoring/ telemetry  Not on any pressors  Continue cardiovascular / antihypertensive medications    ===================== RENAL ============================  Continue LR 30CC/hr      D/C IVF  Monitor I/Os, BUN/ Cr  and electrolytes  D/C Moraes      Keep Moraes  BPH: Continue Flomax/ Finasteride      ==================== GASTROINTESTINAL===================  On regular diet, tolerating well  Continue GI prophylaxis with Pepcid / Protonix  Continue Zofran / Reglan for nausea - PRN	  NPO    =======================    ENDOCRIN  =====================  Glycemic monitoring  F/S with coverage  ===================HEMATOLOGIC/ONCOLOGIC =============  Monitor chest tube output. No signs of active bleeding.   Follow CBC, coags  in AM  DVT prophylaxis with SCD, sc Heparin    ========================INFECTIOUS DISEASE===============  No signs of infection. Monitor for fever / leukocytosis.  All surgical incision / chest tube  sites look clean  D/C Moraes      Pertinent clinical, laboratory, radiographic, hemodynamic, echocardiographic, respiratory data, microbiologic data and chart were reviewed and analyzed frequently throughout the course of the day and night. GI and DVT prophylaxis, glycemic control, head of bed elevation and skin care issues were addressed.  Patient seen, examined and plan discussed with CT Surgery / CTICU team during rounds.  Pt remains critically ill in imminent risk of  deterioration and requires very careful cardio- pulmonary monitoring and support.    I have spent               minutes of critical care time with this pt between            am/pm    and               am/ pm         minutes spent on total encounter; more than 50% of the visit was spent counseling and/or coordinating care by the attending physician.        COURTNEY Miller MD JOY BAILON          MRN-[id number]    HPI:  67 year old female  with diagnosis of solitary pulmonary nodule scheduled for right video assisted thoracoscopy, right lower lobectomy, mediastinal lymph node dissection for 18. Pt with Hx of lung cancer s/p left lower lobectomy in , found a new right lower lobe nodule on imaging, referred for surgical evaluation. Pt denies weight loss, cough, SOB, hemoptysis, of fever. (29 Dec 2017 15:18)      Procedure: 18  Right lower lobectomy with mediastinal lymph node dissection  POD # : 0    Issues: postop pain             right chest tube in place             hx lung cancer, HTN, hypothyroidism      Interval/OR Events/ ROS  S/p uneventful surgery - Pt extubated postop in the OR. On arrival in ICU - awake; c/o moderate pain at chest tube site. No SOB, no palpitations        PAST MEDICAL & SURGICAL HISTORY:  Lung cancer  GERD (gastroesophageal reflux disease)  Solitary pulmonary nodule  Hypertension  Raynauds disease  Hypothyroid  Breast cyst: benign  Graves disease: s/p radioactive iodine ~10 years ago  OP (osteoporosis)  Migraines  Venous insufficiency  Hydradenitis: excision, ~17 years ago  H/O melanoma in situ: excision from left wrist  Status post cataract extraction: left eye done 10/5/2016 2017  H/O vein strippin, ,   H/O: lung cancer: Left Lower Lobectomy     Allergies  latex (Rash)  metal, nickel - rash, hives (Other)  No Known Drug Allergies      ***VITAL SIGNS:  Vital Signs Last 24 Hrs  T(C): 34.8 (2018 17:00), Max: 36.6 (2018 11:09)  T(F): 94.7 (2018 17:00), Max: 97.8 (2018 11:09)  HR: 50 (2018 18:00) (50 - 68)  BP: 120/71 (2018 11:09) (120/71 - 120/71)  BP(mean): --  RR: 16 (2018 18:00) (15 - 21)  SpO2: 99% (2018 18:00) (95% - 99%)    I/Os:   I&O's Detail    2018 07:01  -  2018 18:05  --------------------------------------------------------  IN:    lactated ringers.: 60 mL  Total IN: 60 mL    OUT:    Chest Tube: 15 mL    Indwelling Catheter - Urethral: 30 mL  Total OUT: 45 mL    Total NET: 15 mL      =======================  MEDICATIONS  ===================  MEDICATIONS  (STANDING):  docusate sodium 100 milliGRAM(s) Oral three times a day  heparin  Injectable 5000 Unit(s) SubCutaneous every 8 hours  hydrochlorothiazide 12.5 milliGRAM(s) Oral daily  HYDROmorphone PCA (1 mG/mL) 30 milliLiter(s) PCA Continuous   lactated ringers. 1000 milliLiter(s) (30 mL/Hr) IV Continuous   losartan 25 milliGRAM(s) Oral daily  pantoprazole    Tablet 40 milliGRAM(s) Oral before breakfast    MEDICATIONS  (PRN):  artificial  tears Solution 1 Drop(s) Both EYES four times a day PRN Dry Eyes  HYDROmorphone PCA (1 mG/mL) Rescue Clinician Bolus 0.5 milliGRAM(s) IV Push every 15 minutes PRN for Pain Scale GREATER THAN 6  naloxone Injectable 0.1 milliGRAM(s) IV Push every 3 minutes PRN For ANY of the following changes in patient status:  A. RR LESS THAN 10 breaths per minute, B. Oxygen saturation LESS THAN 90%, C. Sedation score of 6  ondansetron Injectable 4 milliGRAM(s) IV Push every 6 hours PRN Nausea      =================== PATIENT CARE ACCESS DEVICES ==========  Peripheral IV (+)  Arterial Line	L/  Rad Placed: 18				  Urinary Catheter, Date Placed: 18  Necessity of urinary, arterial, and venous catheters discussed    ======================= PHYSICAL EXAM===================  General:            Comfortable, Awake, alert, not in any distress  Neuro:               Moving all extremities to commands. No focal deficits	  HEENT:               TANK  Respiratory:	 Lungs clear on auscultation bilaterally with good aeration.                            No rales, rhonchi, no wheezing. Effort even and unlabored.                           Right chest tube site - clean, no air leak  CV:		Regular rate and rhythm. Normal S1/S2. No murmurs  Abdomen:         Soft,  nontender, not-distended. Bowel sounds present  Skin:		No rash.  Extremities:	Warm, no cyanosis or edema.  Palpable pulses    ============================ LABS =======================                      ===================== IMAGING STUDIES ===================  Radiology personally reviewed.    ====================ASSESSMENT AND PLAN ================      ====================== NEUROLOGY=======================  Pain control with PCA / PCEA / Tylenol IV / Toradol / Percocet  Pt is on Precedex for agitation  Pt is sedated with Propofol / Fentanyl    ==================== RESPIRATORY========================  Pt is on            L nasal canula / Face tent____% FiO2  Comfortable, no evidence of distress.  Using incentive spirometry & doing                ml  Monitor chest tube output  Chest tube to suction / water seal	    Mechanical Ventilation:    Mechanical ventilator status assessed & settings reviewed  Continue bronchodilators, pulmonary toilet  Head of bed elevation to 30-40 degrees    ====================CARDIOVASCULAR=====================  Continue hemodynamic monitoring/ telemetry  Not on any pressors  Continue cardiovascular / antihypertensive medications    ===================== RENAL ============================  Continue LR 30CC/hr      D/C IVF  Monitor I/Os, BUN/ Cr  and electrolytes  D/C Moraes      Keep Moraes  BPH: Continue Flomax/ Finasteride      ==================== GASTROINTESTINAL===================  On regular diet, tolerating well  Continue GI prophylaxis with Pepcid / Protonix  Continue Zofran / Reglan for nausea - PRN	  NPO    =======================    ENDOCRIN  =====================  Glycemic monitoring  F/S with coverage  ===================HEMATOLOGIC/ONCOLOGIC =============  Monitor chest tube output. No signs of active bleeding.   Follow CBC, coags  in AM  DVT prophylaxis with SCD, sc Heparin    ========================INFECTIOUS DISEASE===============  No signs of infection. Monitor for fever / leukocytosis.  All surgical incision / chest tube  sites look clean  D/C Moraes      Pertinent clinical, laboratory, radiographic, hemodynamic, echocardiographic, respiratory data, microbiologic data and chart were reviewed and analyzed frequently throughout the course of the day and night. GI and DVT prophylaxis, glycemic control, head of bed elevation and skin care issues were addressed.  Patient seen, examined and plan discussed with CT Surgery / CTICU team during rounds.  Pt remains critically ill in imminent risk of  deterioration and requires very careful cardio- pulmonary monitoring and support.    I have spent               minutes of critical care time with this pt between            am/pm    and               am/ pm         minutes spent on total encounter; more than 50% of the visit was spent counseling and/or coordinating care by the attending physician.        COURTNEY Miller MD JOY BAILON          MRN-[id number]    HPI:  67 year old female  with diagnosis of solitary pulmonary nodule scheduled for right video assisted thoracoscopy, right lower lobectomy, mediastinal lymph node dissection for 18. Pt with Hx of lung cancer s/p left lower lobectomy in , found a new right lower lobe nodule on imaging, referred for surgical evaluation. Pt denies weight loss, cough, SOB, hemoptysis, of fever. (29 Dec 2017 15:18)      Procedure: 18  Right lower lobectomy with mediastinal lymph node dissection  POD # : 0    Issues: postop pain             right chest tube in place             hx lung cancer, HTN, hypothyroidism      Interval/OR Events/ ROS  S/p uneventful surgery - Pt extubated postop in the OR. On arrival in ICU - awake; c/o moderate pain at chest tube site. No SOB, no palpitations        PAST MEDICAL & SURGICAL HISTORY:  Lung cancer  GERD (gastroesophageal reflux disease)  Solitary pulmonary nodule  Hypertension  Raynauds disease  Hypothyroid  Breast cyst: benign  Graves disease: s/p radioactive iodine ~10 years ago  OP (osteoporosis)  Migraines  Venous insufficiency  Hydradenitis: excision, ~17 years ago  H/O melanoma in situ: excision from left wrist  Status post cataract extraction: left eye done 10/5/2016 2017  H/O vein strippin, ,   H/O: lung cancer: Left Lower Lobectomy     Allergies  latex (Rash)  metal, nickel - rash, hives (Other)  No Known Drug Allergies      ***VITAL SIGNS:  Vital Signs Last 24 Hrs  T(C): 34.8 (2018 17:00), Max: 36.6 (2018 11:09)  T(F): 94.7 (2018 17:00), Max: 97.8 (2018 11:09)  HR: 50 (2018 18:00) (50 - 68)  BP: 120/71 (2018 11:09) (120/71 - 120/71)  BP(mean): --  RR: 16 (2018 18:00) (15 - 21)  SpO2: 99% (2018 18:00) (95% - 99%)    I/Os:   I&O's Detail    2018 07:01  -  2018 18:05  --------------------------------------------------------  IN:    lactated ringers.: 60 mL  Total IN: 60 mL    OUT:    Chest Tube: 15 mL    Indwelling Catheter - Urethral: 30 mL  Total OUT: 45 mL    Total NET: 15 mL      =======================  MEDICATIONS  ===================  MEDICATIONS  (STANDING):  docusate sodium 100 milliGRAM(s) Oral three times a day  heparin  Injectable 5000 Unit(s) SubCutaneous every 8 hours  hydrochlorothiazide 12.5 milliGRAM(s) Oral daily  HYDROmorphone PCA (1 mG/mL) 30 milliLiter(s) PCA Continuous   lactated ringers. 1000 milliLiter(s) (30 mL/Hr) IV Continuous   losartan 25 milliGRAM(s) Oral daily  pantoprazole    Tablet 40 milliGRAM(s) Oral before breakfast  Synthroid PO    MEDICATIONS  (PRN):  artificial  tears Solution 1 Drop(s) Both EYES four times a day PRN Dry Eyes  HYDROmorphone PCA (1 mG/mL) Rescue Clinician Bolus 0.5 milliGRAM(s) IV Push every 15 minutes PRN for Pain Scale GREATER THAN 6  naloxone Injectable 0.1 milliGRAM(s) IV Push every 3 minutes PRN For ANY of the following changes in patient status:  A. RR LESS THAN 10 breaths per minute, B. Oxygen saturation LESS THAN 90%, C. Sedation score of 6  ondansetron Injectable 4 milliGRAM(s) IV Push every 6 hours PRN Nausea      =================== PATIENT CARE ACCESS DEVICES ==========  Peripheral IV (+)  Arterial Line	L/  Rad Placed: 18				  Urinary Catheter, Date Placed: 18  Necessity of urinary, arterial, and venous catheters discussed    ======================= PHYSICAL EXAM===================  General:            Comfortable, Awake, alert, not in any distress  Neuro:               Moving all extremities to commands. No focal deficits	  HEENT:               TANK  Respiratory:	 Lungs clear on auscultation bilaterally with good aeration.                            No rales, rhonchi, no wheezing. Effort even and unlabored.                           Right chest tube site - clean, no air leak  CV:		Regular rate and rhythm. Normal S1/S2. No murmurs  Abdomen:         Soft,  nontender, not-distended. Bowel sounds present  Skin:		No rash.  Extremities:	Warm, no cyanosis or edema.  Palpable pulses    ============================ LABS =======================    preop labs WNL      ===================== IMAGING STUDIES ===================  pending CXR    ====================ASSESSMENT AND PLAN ================  67 y. F 0 s/p 18  Right lower lobectomy with mediastinal lymph node dissection  POD # : 0    Issues: postop pain             right chest tube in place             hx lung cancer, HTN, hypothyroidism      ====================== NEUROLOGY=======================  Pain control with PCA / Tylenol IV     ==================== RESPIRATORY========================  Pt is on     2       L nasal canula   Comfortable, no evidence of distress.  Due to start using incentive spirometry today when fully awake  Monitor chest tube output  Chest tube to  water seal	  Continue bronchodilators, pulmonary toilet  Head of bed elevation to 30-40 degrees    ====================CARDIOVASCULAR=====================  Continue hemodynamic monitoring/ telemetry  Not on any pressors      ===================== RENAL ============================  Continue LR 30CC/hr        Monitor I/Os, BUN/ Cr  and electrolytes  Keep Moraes for UO monitiring overnight      ==================== GASTROINTESTINAL===================  NPO until  fully awake post anesthesia  Continue GI prophylaxis with  Protonix  Continue Zofran  for nausea - PRN	      =======================    ENDOCRIN  =====================  Glycemic monitoring  F/S with coverage  PO Synthroid for hypothyroidism    ===================HEMATOLOGIC/ONCOLOGIC =============  Monitor chest tube output. No signs of active bleeding.   Follow CBC, coags  in AM  DVT prophylaxis with SCD, sc Heparin    ========================INFECTIOUS DISEASE===============  No signs of infection. Monitor for fever / leukocytosis.  All surgical incision / chest tube  sites look clean  D/C Moraes in AM      Pertinent clinical, laboratory, radiographic, hemodynamic, echocardiographic, respiratory data, microbiologic data and chart were reviewed and analyzed frequently throughout the course of the day and night. GI and DVT prophylaxis, glycemic control, head of bed elevation and skin care issues were addressed.  Patient seen, examined and plan discussed with CT Surgery / CTICU team during rounds.  Pt remains critically ill in imminent risk of  deterioration and requires very careful cardio- pulmonary monitoring and support.    I have spent    35    minutes of critical care time with this pt between  pm    and              7 pm         minutes spent on total encounter; more than 50% of the visit was spent counseling and/or coordinating care by the attending physician.        COURTNEY Miller MD JOY BAILON          [id number]    HPI:  67 year old female  with diagnosis of solitary pulmonary nodule scheduled for right video assisted thoracoscopy, right lower lobectomy, mediastinal lymph node dissection for 18. Pt with Hx of lung cancer s/p left lower lobectomy in , found a new right lower lobe nodule on imaging, referred for surgical evaluation. Pt denies weight loss, cough, SOB, hemoptysis, of fever. (29 Dec 2017 15:18)      Procedure: 18  Right lower lobectomy with mediastinal lymph node dissection  POD # : 0    Issues: postop pain             right chest tube in place             hx lung cancer, HTN, hypothyroidism      Interval/OR Events/ ROS  S/p uneventful surgery - Pt extubated postop in the OR. On arrival in ICU - awake; c/o moderate pain at chest tube site. No SOB, no palpitations        PAST MEDICAL & SURGICAL HISTORY:  Lung cancer  GERD (gastroesophageal reflux disease)  Solitary pulmonary nodule  Hypertension  Raynauds disease  Hypothyroid  Breast cyst: benign  Graves disease: s/p radioactive iodine ~10 years ago  OP (osteoporosis)  Migraines  Venous insufficiency  Hydradenitis: excision, ~17 years ago  H/O melanoma in situ: excision from left wrist  Status post cataract extraction: left eye done 10/5/2016 2017  H/O vein strippin, ,   H/O: lung cancer: Left Lower Lobectomy     Allergies  latex (Rash)  metal, nickel - rash, hives (Other)  No Known Drug Allergies      ***VITAL SIGNS:  Vital Signs Last 24 Hrs  T(C): 34.8 (2018 17:00), Max: 36.6 (2018 11:09)  T(F): 94.7 (2018 17:00), Max: 97.8 (2018 11:09)  HR: 50 (2018 18:00) (50 - 68)  BP: 120/71 (2018 11:09) (120/71 - 120/71)  BP(mean): --  RR: 16 (2018 18:00) (15 - 21)  SpO2: 99% (2018 18:00) (95% - 99%)    I/Os:   I&O's Detail    2018 07:01  -  2018 18:05  --------------------------------------------------------  IN:    lactated ringers.: 60 mL  Total IN: 60 mL    OUT:    Chest Tube: 15 mL    Indwelling Catheter - Urethral: 30 mL  Total OUT: 45 mL    Total NET: 15 mL      =======================  MEDICATIONS  ===================  MEDICATIONS  (STANDING):  docusate sodium 100 milliGRAM(s) Oral three times a day  heparin  Injectable 5000 Unit(s) SubCutaneous every 8 hours  hydrochlorothiazide 12.5 milliGRAM(s) Oral daily  HYDROmorphone PCA (1 mG/mL) 30 milliLiter(s) PCA Continuous   lactated ringers. 1000 milliLiter(s) (30 mL/Hr) IV Continuous   losartan 25 milliGRAM(s) Oral daily  pantoprazole    Tablet 40 milliGRAM(s) Oral before breakfast  Synthroid PO    MEDICATIONS  (PRN):  artificial  tears Solution 1 Drop(s) Both EYES four times a day PRN Dry Eyes  HYDROmorphone PCA (1 mG/mL) Rescue Clinician Bolus 0.5 milliGRAM(s) IV Push every 15 minutes PRN for Pain Scale GREATER THAN 6  naloxone Injectable 0.1 milliGRAM(s) IV Push every 3 minutes PRN For ANY of the following changes in patient status:  A. RR LESS THAN 10 breaths per minute, B. Oxygen saturation LESS THAN 90%, C. Sedation score of 6  ondansetron Injectable 4 milliGRAM(s) IV Push every 6 hours PRN Nausea      =================== PATIENT CARE ACCESS DEVICES ==========  Peripheral IV (+)  Arterial Line	L/  Rad Placed: 18				  Urinary Catheter, Date Placed: 18  Necessity of urinary, arterial, and venous catheters discussed    ======================= PHYSICAL EXAM===================  General:            Comfortable, Awake, alert, not in any distress  Neuro:               Moving all extremities to commands. No focal deficits	  HEENT:               TANK  Respiratory:	 Lungs clear on auscultation bilaterally with good aeration.                            No rales, rhonchi, no wheezing. Effort even and unlabored.                           Right chest tube site - clean, no air leak  CV:		Regular rate and rhythm. Normal S1/S2. No murmurs  Abdomen:         Soft,  nontender, not-distended. Bowel sounds present  Skin:		No rash.  Extremities:	Warm, no cyanosis or edema.  Palpable pulses    ============================ LABS =======================    preop labs WNL      ===================== IMAGING STUDIES ===================  pending CXR    ====================ASSESSMENT AND PLAN ================  67 y. F 0 s/p 18  Right lower lobectomy with mediastinal lymph node dissection  POD # : 0    Issues: postop pain             right chest tube in place             hx lung cancer, HTN, hypothyroidism      ====================== NEUROLOGY=======================  Pain control with PCA / Tylenol IV     ==================== RESPIRATORY========================  Pt is on     2       L nasal canula   Comfortable, no evidence of distress.  Due to start using incentive spirometry today when fully awake  Monitor chest tube output  Chest tube to suction	  Continue bronchodilators, pulmonary toilet  Head of bed elevation to 30-40 degrees    ====================CARDIOVASCULAR=====================  Continue hemodynamic monitoring/ telemetry  Not on any pressors      ===================== RENAL ============================  Continue LR 30CC/hr        Monitor I/Os, BUN/ Cr  and electrolytes  Keep Moraes for UO monitiring overnight      ==================== GASTROINTESTINAL===================  NPO until  fully awake post anesthesia  Continue GI prophylaxis with  Protonix  Continue Zofran  for nausea - PRN	      =======================    ENDOCRIN  =====================  Glycemic monitoring  F/S with coverage  PO Synthroid for hypothyroidism    ===================HEMATOLOGIC/ONCOLOGIC =============  Monitor chest tube output. No signs of active bleeding.   Follow CBC, coags  in AM  DVT prophylaxis with SCD, sc Heparin    ========================INFECTIOUS DISEASE===============  No signs of infection. Monitor for fever / leukocytosis.  All surgical incision / chest tube  sites look clean  D/C Moraes in AM      Pertinent clinical, laboratory, radiographic, hemodynamic, echocardiographic, respiratory data, microbiologic data and chart were reviewed and analyzed frequently throughout the course of the day and night. GI and DVT prophylaxis, glycemic control, head of bed elevation and skin care issues were addressed.  Patient seen, examined and plan discussed with CT Surgery / CTICU team during rounds.  Pt remains critically ill in imminent risk of  deterioration and requires very careful cardio- pulmonary monitoring and support.    I have spent    35    minutes of critical care time with this pt between  pm    and              7 pm         minutes spent on total encounter; more than 50% of the visit was spent counseling and/or coordinating care by the attending physician.        COURTNEY Miller MD

## 2018-01-09 LAB
ALBUMIN SERPL ELPH-MCNC: 3.5 G/DL — SIGNIFICANT CHANGE UP (ref 3.3–5)
ALP SERPL-CCNC: 36 U/L — LOW (ref 40–120)
ALT FLD-CCNC: 18 U/L — SIGNIFICANT CHANGE UP (ref 4–33)
AST SERPL-CCNC: 26 U/L — SIGNIFICANT CHANGE UP (ref 4–32)
BASOPHILS # BLD AUTO: 0.02 K/UL — SIGNIFICANT CHANGE UP (ref 0–0.2)
BASOPHILS NFR BLD AUTO: 0.2 % — SIGNIFICANT CHANGE UP (ref 0–2)
BILIRUB SERPL-MCNC: 0.4 MG/DL — SIGNIFICANT CHANGE UP (ref 0.2–1.2)
BUN SERPL-MCNC: 16 MG/DL — SIGNIFICANT CHANGE UP (ref 7–23)
CALCIUM SERPL-MCNC: 7.5 MG/DL — LOW (ref 8.4–10.5)
CHLORIDE SERPL-SCNC: 103 MMOL/L — SIGNIFICANT CHANGE UP (ref 98–107)
CO2 SERPL-SCNC: 26 MMOL/L — SIGNIFICANT CHANGE UP (ref 22–31)
CREAT SERPL-MCNC: 0.66 MG/DL — SIGNIFICANT CHANGE UP (ref 0.5–1.3)
EOSINOPHIL # BLD AUTO: 0 K/UL — SIGNIFICANT CHANGE UP (ref 0–0.5)
EOSINOPHIL NFR BLD AUTO: 0 % — SIGNIFICANT CHANGE UP (ref 0–6)
GLUCOSE SERPL-MCNC: 109 MG/DL — HIGH (ref 70–99)
HCT VFR BLD CALC: 33.7 % — LOW (ref 34.5–45)
HGB BLD-MCNC: 11.3 G/DL — LOW (ref 11.5–15.5)
IMM GRANULOCYTES # BLD AUTO: 0.02 # — SIGNIFICANT CHANGE UP
IMM GRANULOCYTES NFR BLD AUTO: 0.2 % — SIGNIFICANT CHANGE UP (ref 0–1.5)
LYMPHOCYTES # BLD AUTO: 0.65 K/UL — LOW (ref 1–3.3)
LYMPHOCYTES # BLD AUTO: 7.1 % — LOW (ref 13–44)
MCHC RBC-ENTMCNC: 32.1 PG — SIGNIFICANT CHANGE UP (ref 27–34)
MCHC RBC-ENTMCNC: 33.5 % — SIGNIFICANT CHANGE UP (ref 32–36)
MCV RBC AUTO: 95.7 FL — SIGNIFICANT CHANGE UP (ref 80–100)
MONOCYTES # BLD AUTO: 0.55 K/UL — SIGNIFICANT CHANGE UP (ref 0–0.9)
MONOCYTES NFR BLD AUTO: 6 % — SIGNIFICANT CHANGE UP (ref 2–14)
NEUTROPHILS # BLD AUTO: 7.9 K/UL — HIGH (ref 1.8–7.4)
NEUTROPHILS NFR BLD AUTO: 86.5 % — HIGH (ref 43–77)
NRBC # FLD: 0 — SIGNIFICANT CHANGE UP
PLATELET # BLD AUTO: 241 K/UL — SIGNIFICANT CHANGE UP (ref 150–400)
PMV BLD: 8.6 FL — SIGNIFICANT CHANGE UP (ref 7–13)
POTASSIUM SERPL-MCNC: 3.8 MMOL/L — SIGNIFICANT CHANGE UP (ref 3.5–5.3)
POTASSIUM SERPL-SCNC: 3.8 MMOL/L — SIGNIFICANT CHANGE UP (ref 3.5–5.3)
PROT SERPL-MCNC: 6.2 G/DL — SIGNIFICANT CHANGE UP (ref 6–8.3)
RBC # BLD: 3.52 M/UL — LOW (ref 3.8–5.2)
RBC # FLD: 14.1 % — SIGNIFICANT CHANGE UP (ref 10.3–14.5)
SODIUM SERPL-SCNC: 138 MMOL/L — SIGNIFICANT CHANGE UP (ref 135–145)
WBC # BLD: 9.14 K/UL — SIGNIFICANT CHANGE UP (ref 3.8–10.5)
WBC # FLD AUTO: 9.14 K/UL — SIGNIFICANT CHANGE UP (ref 3.8–10.5)

## 2018-01-09 PROCEDURE — 99233 SBSQ HOSP IP/OBS HIGH 50: CPT

## 2018-01-09 PROCEDURE — 71045 X-RAY EXAM CHEST 1 VIEW: CPT | Mod: 26

## 2018-01-09 RX ORDER — ACETAMINOPHEN 500 MG
650 TABLET ORAL EVERY 6 HOURS
Qty: 0 | Refills: 0 | Status: COMPLETED | OUTPATIENT
Start: 2018-01-09 | End: 2018-01-11

## 2018-01-09 RX ORDER — OXYCODONE HYDROCHLORIDE 5 MG/1
5 TABLET ORAL
Qty: 0 | Refills: 0 | Status: DISCONTINUED | OUTPATIENT
Start: 2018-01-09 | End: 2018-01-11

## 2018-01-09 RX ADMIN — LOSARTAN POTASSIUM 25 MILLIGRAM(S): 100 TABLET, FILM COATED ORAL at 06:16

## 2018-01-09 RX ADMIN — Medication 100 MILLIGRAM(S): at 06:15

## 2018-01-09 RX ADMIN — Medication 400 MILLIGRAM(S): at 03:30

## 2018-01-09 RX ADMIN — Medication 100 MILLIGRAM(S): at 21:04

## 2018-01-09 RX ADMIN — HYDROMORPHONE HYDROCHLORIDE 30 MILLILITER(S): 2 INJECTION INTRAMUSCULAR; INTRAVENOUS; SUBCUTANEOUS at 07:28

## 2018-01-09 RX ADMIN — PANTOPRAZOLE SODIUM 40 MILLIGRAM(S): 20 TABLET, DELAYED RELEASE ORAL at 06:16

## 2018-01-09 RX ADMIN — Medication 100 MILLIGRAM(S): at 13:36

## 2018-01-09 RX ADMIN — HEPARIN SODIUM 5000 UNIT(S): 5000 INJECTION INTRAVENOUS; SUBCUTANEOUS at 21:04

## 2018-01-09 RX ADMIN — Medication 650 MILLIGRAM(S): at 17:30

## 2018-01-09 RX ADMIN — ONDANSETRON 4 MILLIGRAM(S): 8 TABLET, FILM COATED ORAL at 07:40

## 2018-01-09 RX ADMIN — Medication 650 MILLIGRAM(S): at 23:15

## 2018-01-09 RX ADMIN — SODIUM CHLORIDE 30 MILLILITER(S): 9 INJECTION, SOLUTION INTRAVENOUS at 07:28

## 2018-01-09 RX ADMIN — Medication 12.5 MILLIGRAM(S): at 06:16

## 2018-01-09 RX ADMIN — Medication 650 MILLIGRAM(S): at 11:00

## 2018-01-09 RX ADMIN — Medication 1000 MILLIGRAM(S): at 03:45

## 2018-01-09 RX ADMIN — HEPARIN SODIUM 5000 UNIT(S): 5000 INJECTION INTRAVENOUS; SUBCUTANEOUS at 13:36

## 2018-01-09 RX ADMIN — HEPARIN SODIUM 5000 UNIT(S): 5000 INJECTION INTRAVENOUS; SUBCUTANEOUS at 06:15

## 2018-01-09 RX ADMIN — Medication 650 MILLIGRAM(S): at 23:45

## 2018-01-09 RX ADMIN — Medication 81 MILLIGRAM(S): at 11:00

## 2018-01-09 RX ADMIN — Medication 650 MILLIGRAM(S): at 18:00

## 2018-01-09 RX ADMIN — Medication 650 MILLIGRAM(S): at 11:30

## 2018-01-09 RX ADMIN — Medication 75 MICROGRAM(S): at 11:52

## 2018-01-09 NOTE — PROGRESS NOTE ADULT - SUBJECTIVE AND OBJECTIVE BOX
Day _2_ of Anesthesia Pain Management Service    Allergies  latex (Rash)  metal, nickel - rash, hives (Other)  No Known Drug Allergies    SUBJECTIVE: "I was nauseous this morning. The nausea medicine helped."    Pain Scale Score	At rest: ___ 	With Activity: _3/10_ 	[ ] Refer to charted pain scores    THERAPY:    [ ] IV PCA Morphine		[ ] 5 mg/mL	[ ] 1 mg/mL  [X] IV PCA Hydromorphone	[ ] 5 mg/mL	[X] 1 mg/mL  [ ] IV PCA Fentanyl		[ ] 50 micrograms/mL    Demand dose _0.2mg_ lockout _6_ (minutes) Continuous Rate _0_ Total: _3mg_  Daily      MEDICATIONS  (STANDING):  acetaminophen   Tablet. 650 milliGRAM(s) Oral every 6 hours  aspirin enteric coated 81 milliGRAM(s) Oral daily  docusate sodium 100 milliGRAM(s) Oral three times a day  heparin  Injectable 5000 Unit(s) SubCutaneous every 8 hours  hydrochlorothiazide 12.5 milliGRAM(s) Oral daily  HYDROmorphone PCA (1 mG/mL) 30 milliLiter(s) PCA Continuous PCA Continuous  lactated ringers. 1000 milliLiter(s) (30 mL/Hr) IV Continuous <Continuous>  levothyroxine 75 MICROGram(s) Oral every other day  losartan 25 milliGRAM(s) Oral daily  pantoprazole    Tablet 40 milliGRAM(s) Oral before breakfast    MEDICATIONS  (PRN):  artificial  tears Solution 1 Drop(s) Both EYES four times a day PRN Dry Eyes  HYDROmorphone PCA (1 mG/mL) Rescue Clinician Bolus 0.5 milliGRAM(s) IV Push every 15 minutes PRN for Pain Scale GREATER THAN 6  naloxone Injectable 0.1 milliGRAM(s) IV Push every 3 minutes PRN For ANY of the following changes in patient status:  A. RR LESS THAN 10 breaths per minute, B. Oxygen saturation LESS THAN 90%, C. Sedation score of 6  ondansetron Injectable 4 milliGRAM(s) IV Push every 6 hours PRN Nausea      OBJECTIVE: A&Ox3, NAD. Ambulates well, assisted by RN.    Sedation Score:	[X] Alert	[ ] Drowsy	[ ] Arousable	[ ] Asleep	[ ] Unresponsive    Side Effects:	[X] None	[ ] Nausea	[ ] Vomiting	[ ] Pruritus  		  [ ] Weakness		[ ] Numbness	[ ] Other:                          11.3   9.14  )-----------( 241      ( 09 Jan 2018 03:45 )             33.7       01-09    138  |  103  |  16  ----------------------------<  109<H>  3.8   |  26  |  0.66    Ca    7.5<L>      09 Jan 2018 03:45    TPro  6.2  /  Alb  3.5  /  TBili  0.4  /  DBili  x   /  AST  26  /  ALT  18  /  AlkPhos  36<L>  01-09      ASSESSMENT/ PLAN    Therapy to  be:	[X] Continue   [ ] Discontinued   [ ] Change to prn Analgesics    Documentation and Verification of current medications:  [X] Done	[ ] Not done, not eligible  [ ] Not done, reason not given    Comments:  Advanced to clears  Add Tylenol q6hrs x 2 days  Later change to oral analgesics if PO intake tolerated.

## 2018-01-09 NOTE — PROGRESS NOTE ADULT - SUBJECTIVE AND OBJECTIVE BOX
Anesthesia Pain Management Service    SUBJECTIVE: Pt doing well with IV PCA without problems reported.    Therapy:	  [ X] IV PCA	   [ ] Epidural           [ ] s/p Spinal Opoid              [ ] Postpartum infusion	  [ ] Patient controlled regional anesthesia (PCRA)    [ ] prn Analgesics    Allergies    latex (Rash)  metal, nickel - rash, hives (Other)  No Known Drug Allergies    Intolerances      MEDICATIONS  (STANDING):  acetaminophen   Tablet. 650 milliGRAM(s) Oral every 6 hours  aspirin enteric coated 81 milliGRAM(s) Oral daily  docusate sodium 100 milliGRAM(s) Oral three times a day  heparin  Injectable 5000 Unit(s) SubCutaneous every 8 hours  hydrochlorothiazide 12.5 milliGRAM(s) Oral daily  lactated ringers. 1000 milliLiter(s) (30 mL/Hr) IV Continuous <Continuous>  levothyroxine 75 MICROGram(s) Oral every other day  losartan 25 milliGRAM(s) Oral daily  pantoprazole    Tablet 40 milliGRAM(s) Oral before breakfast    MEDICATIONS  (PRN):  artificial  tears Solution 1 Drop(s) Both EYES four times a day PRN Dry Eyes  naloxone Injectable 0.1 milliGRAM(s) IV Push every 3 minutes PRN For ANY of the following changes in patient status:  A. RR LESS THAN 10 breaths per minute, B. Oxygen saturation LESS THAN 90%, C. Sedation score of 6  ondansetron Injectable 4 milliGRAM(s) IV Push every 6 hours PRN Nausea  oxyCODONE    IR 5 milliGRAM(s) Oral every 3 hours PRN Moderate and Severe Pain (4 - 10)      OBJECTIVE:   [X] No new signs     [ ] Other:    Side Effects:  [X ] None			[ ] Other:    Assessment of Catheter Site:		[ ] Intact		[ ] Other:    ASSESSMENT/PLAN  [ X] Continue current therapy    [ ] Therapy changed to:    [ ] IV PCA       [ ] Epidural     [ ] prn Analgesics     Comments:    Progress Note written now but Patient was seen earlier.

## 2018-01-09 NOTE — PROGRESS NOTE ADULT - SUBJECTIVE AND OBJECTIVE BOX
JOY BAILON  [id number]    HPI:  67 year old female presents to presurgical testing with diagnosis of solitary pulmonary nodule scheduled for right video assisted thoracoscopy, right lower lobectomy, mediastinal lymph node dissection for 18. Pt with Hx of lung cancer s/p left lower lobectomy in , found a new right upper lobe nodule on imaging, referred for surgical evaluation. Pt denies weight loss, cough, SOB, hemoptysis, of fever. (29 Dec 2017 15:18)      Procedure: 18  Right lower lobectomy with mediastinal lymph node dissection    POD # : 1    Issues: postop pain             right chest tube in place             hx lung cancer, HTN, hypothyroidism      PAST MEDICAL & SURGICAL HISTORY:  Lung cancer  GERD (gastroesophageal reflux disease)  Solitary pulmonary nodule  Hypertension  Raynauds disease  Hypothyroid  Breast cyst: benign  Graves disease: s/p radioactive iodine ~10 years ago  OP (osteoporosis)  Migraines  Venous insufficiency  Hydradenitis: excision, ~17 years ago  H/O melanoma in situ: excision from left wrist  Status post cataract extraction: left eye done 10/5/2016 2017  H/O vein strippin, ,   H/O: lung cancer: Left Lower Lobectomy       ***VITAL SIGNS:  Vital Signs Last 24 Hrs  T(C): 36 (2018 00:00), Max: 36.6 (2018 11:09)  T(F): 96.8 (2018 00:00), Max: 97.8 (2018 11:09)  HR: 65 (2018 01:00) (50 - 73)  BP: 128/61 (2018 01:00) (111/57 - 128/61)  BP(mean): 78 (2018 01:00) (69 - 78)  RR: 12 (2018 01:00) (9 - 21)  SpO2: 100% (2018 01:00) (95% - 100%)  I/Os:   I&O's Detail    2018 07:01  -  2018 02:42  --------------------------------------------------------  IN:    lactated ringers.: 330 mL  Total IN: 330 mL    OUT:    Chest Tube: 30 mL    Chest Tube: 215 mL    Indwelling Catheter - Urethral: 245 mL  Total OUT: 490 mL    Total NET: -160 mL        CAPILLARY BLOOD GLUCOSE        ======================= PATIENT CARE ACCESS DEVICES ===================  Peripheral IV      ======================= PHYSICAL EXAM============================  General:                         Awake, alert, not in any distress  Neuro:                            Moving all extremities to commands. Nonfocal	  Respiratory:	Lungs clear to auscultation bilaterally. chest tube to suction                                          No rales, rhonchi. Effort even and unlabored.  CV:		Regular rate and rhythm. Normal S1/S2. No murmurs                                          Distal pulses present.  Abdomen:	                     Soft, non-distended. Bowel sounds present / absent.   Skin:		No rash.  Extremities:	Warm, no cyanosis or edema.  Palpable pulses              =======================  MEDICATIONS  =================  MEDICATIONS  (STANDING):  acetaminophen  IVPB. 1000 milliGRAM(s) IV Intermittent once  aspirin enteric coated 81 milliGRAM(s) Oral daily  docusate sodium 100 milliGRAM(s) Oral three times a day  heparin  Injectable 5000 Unit(s) SubCutaneous every 8 hours  hydrochlorothiazide 12.5 milliGRAM(s) Oral daily  HYDROmorphone PCA (1 mG/mL) 30 milliLiter(s) PCA Continuous PCA Continuous  lactated ringers. 1000 milliLiter(s) (30 mL/Hr) IV Continuous <Continuous>  levothyroxine 75 MICROGram(s) Oral every other day  losartan 25 milliGRAM(s) Oral daily  pantoprazole    Tablet 40 milliGRAM(s) Oral before breakfast    MEDICATIONS  (PRN):  artificial  tears Solution 1 Drop(s) Both EYES four times a day PRN Dry Eyes  HYDROmorphone PCA (1 mG/mL) Rescue Clinician Bolus 0.5 milliGRAM(s) IV Push every 15 minutes PRN for Pain Scale GREATER THAN 6  naloxone Injectable 0.1 milliGRAM(s) IV Push every 3 minutes PRN For ANY of the following changes in patient status:  A. RR LESS THAN 10 breaths per minute, B. Oxygen saturation LESS THAN 90%, C. Sedation score of 6  ondansetron Injectable 4 milliGRAM(s) IV Push every 6 hours PRN Nausea      ====================== NEUROLOGY=====================  Pain control with PCA / Tylenol IV  Nonfocal    ==================== RESPIRATORY======================  Pt is on 2 L nasal canula  Comfortable, not in any distress.  Using incentive spirometry   Monitor chest tube output  Chest tube to suction	  Continue bronchodilators, pulmonary toilet    ====================CARDIOVASCULAR==================  Continue hemodynamic monitoring.  Not on any pressors    ===================== RENAL =========================  Continue  IVF  Monitor I/Os and electrolytes      ==================== GASTROINTESTINAL===================  On regular diet, tolerating  Continue GI prophylaxis with Pepcid / Protonix  Continue Zofran / Reglan for nausea - PRN	      =======================    ENDOCRIN  =====================  Glycemic monitoring  F/S with coverage  ===================HEMATOLOGIC/ONC ===================  Monitor chest tube output. No signs of active bleeding.   Follow CBC in AM  DVT prophylaxis with SCD, sc Heparin    ========================INFECTIOUS DISEASE================  No signs of infection. Monitor for fever / leukocytosis.  All surgical incision / chest tube  sites look clean  D/C Moraes      Pertinent clinical, laboratory, radiographic, hemodynamic, echocardiographic, respiratory data, microbiologic data and chart were reviewed and analyzed frequently throughout the course of the day and night. GI and DVT prophylaxis, glycemic control, head of bed elevation and skin care issues were addressed.  Patient seen, examined and plan discussed with CT Surgery / CTICU team during rounds.          Qiuping Quique MANCIA, FACEP

## 2018-01-09 NOTE — PROGRESS NOTE ADULT - SUBJECTIVE AND OBJECTIVE BOX
POST ANESTHESIA EVALUATION    67y Female POSTOP DAY 1 S/P     MENTAL STATUS: Patient participation [ X ] Awake     [  ] Arousable     [  ] Sedated    AIRWAY PATENCY: [X  ] Satisfactory  [  ] Other:     Vital Signs Last 24 Hrs  T(C): 36.8 (09 Jan 2018 16:00), Max: 36.8 (09 Jan 2018 16:00)  T(F): 98.3 (09 Jan 2018 16:00), Max: 98.3 (09 Jan 2018 16:00)  HR: 66 (09 Jan 2018 16:00) (50 - 73)  BP: 112/61 (09 Jan 2018 16:00) (105/63 - 151/62)  BP(mean): 73 (09 Jan 2018 16:00) (66 - 83)  RR: 21 (09 Jan 2018 16:00) (9 - 22)  SpO2: 98% (09 Jan 2018 16:00) (93% - 100%)  I&O's Summary    08 Jan 2018 07:01  -  09 Jan 2018 07:00  --------------------------------------------------------  IN: 610 mL / OUT: 765 mL / NET: -155 mL    09 Jan 2018 07:01  -  09 Jan 2018 17:27  --------------------------------------------------------  IN: 1020 mL / OUT: 520 mL / NET: 500 mL          NAUSEA/ VOMITTING:  [ X ] NONE  [  ] CONTROLLED [  ] OTHER     PAIN: [ X ] CONTROLLED WITH CURRENT REGIMEN  [  ] OTHER    [ X ] NO APPARENT ANESTHESIA COMPLICATIONS      Comments:

## 2018-01-10 LAB
APTT BLD: 29.5 SEC — SIGNIFICANT CHANGE UP (ref 27.5–37.4)
BUN SERPL-MCNC: 10 MG/DL — SIGNIFICANT CHANGE UP (ref 7–23)
CALCIUM SERPL-MCNC: 7.7 MG/DL — LOW (ref 8.4–10.5)
CHLORIDE SERPL-SCNC: 100 MMOL/L — SIGNIFICANT CHANGE UP (ref 98–107)
CO2 SERPL-SCNC: 27 MMOL/L — SIGNIFICANT CHANGE UP (ref 22–31)
CREAT SERPL-MCNC: 0.62 MG/DL — SIGNIFICANT CHANGE UP (ref 0.5–1.3)
GLUCOSE SERPL-MCNC: 121 MG/DL — HIGH (ref 70–99)
HCT VFR BLD CALC: 34 % — LOW (ref 34.5–45)
HGB BLD-MCNC: 11.1 G/DL — LOW (ref 11.5–15.5)
INR BLD: 1.04 — SIGNIFICANT CHANGE UP (ref 0.88–1.17)
MAGNESIUM SERPL-MCNC: 2 MG/DL — SIGNIFICANT CHANGE UP (ref 1.6–2.6)
MCHC RBC-ENTMCNC: 30.6 PG — SIGNIFICANT CHANGE UP (ref 27–34)
MCHC RBC-ENTMCNC: 32.6 % — SIGNIFICANT CHANGE UP (ref 32–36)
MCV RBC AUTO: 93.7 FL — SIGNIFICANT CHANGE UP (ref 80–100)
NRBC # FLD: 0 — SIGNIFICANT CHANGE UP
PHOSPHATE SERPL-MCNC: 1.7 MG/DL — LOW (ref 2.5–4.5)
PLATELET # BLD AUTO: 257 K/UL — SIGNIFICANT CHANGE UP (ref 150–400)
PMV BLD: 8.8 FL — SIGNIFICANT CHANGE UP (ref 7–13)
POTASSIUM SERPL-MCNC: 3.3 MMOL/L — LOW (ref 3.5–5.3)
POTASSIUM SERPL-SCNC: 3.3 MMOL/L — LOW (ref 3.5–5.3)
PROTHROM AB SERPL-ACNC: 12 SEC — SIGNIFICANT CHANGE UP (ref 9.8–13.1)
RBC # BLD: 3.63 M/UL — LOW (ref 3.8–5.2)
RBC # FLD: 13.8 % — SIGNIFICANT CHANGE UP (ref 10.3–14.5)
SODIUM SERPL-SCNC: 137 MMOL/L — SIGNIFICANT CHANGE UP (ref 135–145)
WBC # BLD: 8.18 K/UL — SIGNIFICANT CHANGE UP (ref 3.8–10.5)
WBC # FLD AUTO: 8.18 K/UL — SIGNIFICANT CHANGE UP (ref 3.8–10.5)

## 2018-01-10 PROCEDURE — 71045 X-RAY EXAM CHEST 1 VIEW: CPT | Mod: 26

## 2018-01-10 PROCEDURE — 99233 SBSQ HOSP IP/OBS HIGH 50: CPT

## 2018-01-10 RX ORDER — KETOROLAC TROMETHAMINE 30 MG/ML
15 SYRINGE (ML) INJECTION ONCE
Qty: 0 | Refills: 0 | Status: DISCONTINUED | OUTPATIENT
Start: 2018-01-10 | End: 2018-01-10

## 2018-01-10 RX ORDER — ACETAMINOPHEN 500 MG
1000 TABLET ORAL ONCE
Qty: 0 | Refills: 0 | Status: COMPLETED | OUTPATIENT
Start: 2018-01-10 | End: 2018-01-10

## 2018-01-10 RX ORDER — POTASSIUM CHLORIDE 20 MEQ
40 PACKET (EA) ORAL ONCE
Qty: 0 | Refills: 0 | Status: COMPLETED | OUTPATIENT
Start: 2018-01-10 | End: 2018-01-10

## 2018-01-10 RX ORDER — POTASSIUM PHOSPHATE, MONOBASIC POTASSIUM PHOSPHATE, DIBASIC 236; 224 MG/ML; MG/ML
15 INJECTION, SOLUTION INTRAVENOUS ONCE
Qty: 0 | Refills: 0 | Status: COMPLETED | OUTPATIENT
Start: 2018-01-10 | End: 2018-01-10

## 2018-01-10 RX ADMIN — Medication 100 MILLIGRAM(S): at 21:49

## 2018-01-10 RX ADMIN — OXYCODONE HYDROCHLORIDE 5 MILLIGRAM(S): 5 TABLET ORAL at 11:30

## 2018-01-10 RX ADMIN — Medication 15 MILLIGRAM(S): at 22:04

## 2018-01-10 RX ADMIN — OXYCODONE HYDROCHLORIDE 5 MILLIGRAM(S): 5 TABLET ORAL at 20:40

## 2018-01-10 RX ADMIN — OXYCODONE HYDROCHLORIDE 5 MILLIGRAM(S): 5 TABLET ORAL at 10:33

## 2018-01-10 RX ADMIN — Medication 650 MILLIGRAM(S): at 06:15

## 2018-01-10 RX ADMIN — Medication 650 MILLIGRAM(S): at 13:18

## 2018-01-10 RX ADMIN — Medication 650 MILLIGRAM(S): at 17:29

## 2018-01-10 RX ADMIN — Medication 650 MILLIGRAM(S): at 06:30

## 2018-01-10 RX ADMIN — POTASSIUM PHOSPHATE, MONOBASIC POTASSIUM PHOSPHATE, DIBASIC 62.5 MILLIMOLE(S): 236; 224 INJECTION, SOLUTION INTRAVENOUS at 06:41

## 2018-01-10 RX ADMIN — HEPARIN SODIUM 5000 UNIT(S): 5000 INJECTION INTRAVENOUS; SUBCUTANEOUS at 21:49

## 2018-01-10 RX ADMIN — Medication 15 MILLIGRAM(S): at 05:14

## 2018-01-10 RX ADMIN — OXYCODONE HYDROCHLORIDE 5 MILLIGRAM(S): 5 TABLET ORAL at 19:49

## 2018-01-10 RX ADMIN — Medication 650 MILLIGRAM(S): at 14:18

## 2018-01-10 RX ADMIN — HEPARIN SODIUM 5000 UNIT(S): 5000 INJECTION INTRAVENOUS; SUBCUTANEOUS at 13:18

## 2018-01-10 RX ADMIN — Medication 15 MILLIGRAM(S): at 21:49

## 2018-01-10 RX ADMIN — Medication 40 MILLIEQUIVALENT(S): at 06:40

## 2018-01-10 RX ADMIN — Medication 400 MILLIGRAM(S): at 00:45

## 2018-01-10 RX ADMIN — Medication 100 MILLIGRAM(S): at 06:12

## 2018-01-10 RX ADMIN — Medication 81 MILLIGRAM(S): at 13:19

## 2018-01-10 RX ADMIN — Medication 88 MICROGRAM(S): at 13:19

## 2018-01-10 RX ADMIN — Medication 12.5 MILLIGRAM(S): at 06:12

## 2018-01-10 RX ADMIN — LOSARTAN POTASSIUM 25 MILLIGRAM(S): 100 TABLET, FILM COATED ORAL at 06:12

## 2018-01-10 RX ADMIN — HEPARIN SODIUM 5000 UNIT(S): 5000 INJECTION INTRAVENOUS; SUBCUTANEOUS at 06:12

## 2018-01-10 RX ADMIN — PANTOPRAZOLE SODIUM 40 MILLIGRAM(S): 20 TABLET, DELAYED RELEASE ORAL at 06:12

## 2018-01-10 RX ADMIN — Medication 1000 MILLIGRAM(S): at 01:00

## 2018-01-10 RX ADMIN — Medication 100 MILLIGRAM(S): at 13:19

## 2018-01-10 RX ADMIN — Medication 15 MILLIGRAM(S): at 05:29

## 2018-01-10 NOTE — PROGRESS NOTE ADULT - SUBJECTIVE AND OBJECTIVE BOX
JOY BAILON          MRN-[id number]    HPI:  67 year old female presents to presurgical testing with diagnosis of solitary pulmonary nodule scheduled for right video assisted thoracoscopy, right lower lobectomy, mediastinal lymph node dissection for 18. Pt with Hx of lung cancer s/p left lower lobectomy in , found a new right upper lobe nodule on imaging, referred for surgical evaluation. Pt denies weight loss, cough, SOB, hemoptysis, of fever. (29 Dec 2017 15:18)      Procedure:  POD # :     Issues:        Interval/Overnight Events/ ROS  Pt remained hemodynamically stable overnight, not on any pressors or inotropes. OOB to chair, breathing comfortably with minimal pain. Ambulated several times . Denies pain, no SOB, no palpitations, no nausea/ no vomiting, no dizziness  A-line and oquendo d/obdulia         PAST MEDICAL & SURGICAL HISTORY:  Lung cancer  GERD (gastroesophageal reflux disease)  Solitary pulmonary nodule  Hypertension  Raynauds disease  Hypothyroid  Breast cyst: benign  Graves disease: s/p radioactive iodine ~10 years ago  OP (osteoporosis)  Migraines  Venous insufficiency  Hydradenitis: excision, ~17 years ago  H/O melanoma in situ: excision from left wrist  Status post cataract extraction: left eye done 10/5/2016 2017  H/O vein strippin, ,   H/O: lung cancer: Left Lower Lobectomy     Allergies    latex (Rash)  metal, nickel - rash, hives (Other)  No Known Drug Allergies    Intolerances            ***VITAL SIGNS:  Vital Signs Last 24 Hrs  T(C): 36.5 (10 Connor 2018 00:00), Max: 36.8 (2018 16:00)  T(F): 97.7 (10 Connor 2018 00:00), Max: 98.3 (2018 16:00)  HR: 72 (10 Connor 2018 00:00) (62 - 75)  BP: 127/56 (10 Connor 2018 00:00) (105/63 - 151/62)  BP(mean): 74 (10 Connor 2018 00:00) (66 - 83)  RR: 14 (10 Connor 2018 00:00) (10 - 23)  SpO2: 98% (10 Connor 2018 00:00) (93% - 100%)    I/Os:   I&O's Detail    2018 07:01  -  2018 07:00  --------------------------------------------------------  IN:    IV PiggyBack: 100 mL    lactated ringers.: 510 mL  Total IN: 610 mL    OUT:    Chest Tube: 315 mL    Chest Tube: 30 mL    Indwelling Catheter - Urethral: 420 mL  Total OUT: 765 mL    Total NET: -155 mL      2018 07:  -  10 Connor 2018 00:57  --------------------------------------------------------  IN:    lactated ringers.: 360 mL    Oral Fluid: 960 mL  Total IN: 1320 mL    OUT:    Chest Tube: 310 mL    Voided: 1200 mL  Total OUT: 1510 mL    Total NET: -190 mL          CAPILLARY BLOOD GLUCOSE          =======================  MEDICATIONS  ===================  MEDICATIONS  (STANDING):  acetaminophen   Tablet. 650 milliGRAM(s) Oral every 6 hours  acetaminophen  IVPB. 1000 milliGRAM(s) IV Intermittent once  aspirin enteric coated 81 milliGRAM(s) Oral daily  docusate sodium 100 milliGRAM(s) Oral three times a day  heparin  Injectable 5000 Unit(s) SubCutaneous every 8 hours  hydrochlorothiazide 12.5 milliGRAM(s) Oral daily  levothyroxine 75 MICROGram(s) Oral every other day  levothyroxine 88 MICROGram(s) Oral every other day  losartan 25 milliGRAM(s) Oral daily  pantoprazole    Tablet 40 milliGRAM(s) Oral before breakfast    MEDICATIONS  (PRN):  artificial  tears Solution 1 Drop(s) Both EYES four times a day PRN Dry Eyes  naloxone Injectable 0.1 milliGRAM(s) IV Push every 3 minutes PRN For ANY of the following changes in patient status:  A. RR LESS THAN 10 breaths per minute, B. Oxygen saturation LESS THAN 90%, C. Sedation score of 6  ondansetron Injectable 4 milliGRAM(s) IV Push every 6 hours PRN Nausea  oxyCODONE    IR 5 milliGRAM(s) Oral every 3 hours PRN Moderate and Severe Pain (4 - 10)      ======================VENTILATOR SETTINGS  ==============      =================== PATIENT CARE ACCESS DEVICES ==========  Peripheral IV  Central Venous Line	R	L	IJ	Fem	SC			Placed:   Arterial Line	R	L	PT	DP	Fem	Rad	Ax	Placed:   Midline:				  Urinary Catheter, Date Placed:   Necessity of urinary, arterial, and venous catheters discussed    ======================= PHYSICAL EXAM===================  General:                         Comfortable, Awake, alert, not in any distress  Neuro:                            Moving all extremities to commands. No focal deficits	  HEENT:                           TANK/ ETT/ NGT/ trach  Respiratory:	Lungs clear on auscultation bilaterally with good aeration.                                           No rales, rhonchi, no wheezing. Effort even and unlabored.  CV:		Regular rate and rhythm. Normal S1/S2. No murmurs  Abdomen:	                     Soft,  nontender, not-distended. Bowel sounds present / absent.   Skin:		No rash.  Extremities:	Warm, no cyanosis or edema.  Palpable pulses    ============================ LABS =======================                        11.3   9.14  )-----------( 241      ( 2018 03:45 )             33.7     -    138  |  103  |  16  ----------------------------<  109<H>  3.8   |  26  |  0.66    Ca    7.5<L>      2018 03:45    TPro  6.2  /  Alb  3.5  /  TBili  0.4  /  DBili  x   /  AST  26  /  ALT  18  /  AlkPhos  36<L>      LIVER FUNCTIONS - ( 2018 03:45 )  Alb: 3.5 g/dL / Pro: 6.2 g/dL / ALK PHOS: 36 u/L / ALT: 18 u/L / AST: 26 u/L / GGT: x                     ===================== IMAGING STUDIES ===================  Radiology personally reviewed.    ====================ASSESSMENT AND PLAN ================      ====================== NEUROLOGY=======================  Pain control with PCA / PCEA / Tylenol IV / Toradol / Percocet  Pt is on Precedex for agitation  Pt is sedated with Propofol / Fentanyl    ==================== RESPIRATORY========================  Pt is on            L nasal canula / Face tent____% FiO2  Comfortable, no evidence of distress.  Using incentive spirometry & doing                ml  Monitor chest tube output  Chest tube to suction / water seal	    Mechanical Ventilation:    Mechanical ventilator status assessed & settings reviewed  Continue bronchodilators, pulmonary toilet  Head of bed elevation to 30-40 degrees    ====================CARDIOVASCULAR=====================  Continue hemodynamic monitoring/ telemetry  Not on any pressors  Continue cardiovascular / antihypertensive medications    ===================== RENAL ============================  Continue LR 30CC/hr      D/C IVF  Monitor I/Os, BUN/ Cr  and electrolytes  D/C Oquendo      Keep Oquendo  BPH: Continue Flomax/ Finasteride      ==================== GASTROINTESTINAL===================  On regular diet, tolerating well  Continue GI prophylaxis with Pepcid / Protonix  Continue Zofran / Reglan for nausea - PRN	  NPO    =======================    ENDOCRIN  =====================  Glycemic monitoring  F/S with coverage  ===================HEMATOLOGIC/ONCOLOGIC =============  Monitor chest tube output. No signs of active bleeding.   Follow CBC, coags  in AM  DVT prophylaxis with SCD, sc Heparin    ========================INFECTIOUS DISEASE===============  No signs of infection. Monitor for fever / leukocytosis.  All surgical incision / chest tube  sites look clean  D/C Oquendo      Pertinent clinical, laboratory, radiographic, hemodynamic, echocardiographic, respiratory data, microbiologic data and chart were reviewed and analyzed frequently throughout the course of the day and night. GI and DVT prophylaxis, glycemic control, head of bed elevation and skin care issues were addressed.  Patient seen, examined and plan discussed with CT Surgery / CTICU team during rounds.  Pt remains critically ill in imminent risk of  deterioration and requires very careful cardio- pulmonary monitoring and support.    I have spent               minutes of critical care time with this pt between            am/pm    and               am/ pm         minutes spent on total encounter; more than 50% of the visit was spent counseling and/or coordinating care by the attending physician.        COURTNEY Miller MD JOY BAILON          MRN-[id number]    HPI:  67 year old female  with diagnosis of solitary pulmonary nodule scheduled for right video assisted thoracoscopy, right lower lobectomy, mediastinal lymph node dissection for 18. Pt with Hx of lung cancer s/p left lower lobectomy in , found a new right lower lobe nodule on imaging, referred for surgical evaluation. Pt denies weight loss, cough, SOB, hemoptysis, of fever. (29 Dec 2017 15:18)      Procedure: 18  Right lower lobectomy with mediastinal lymph node dissection  POD # : 2    Issues: postop pain             right chest tube in place             right apical PTX             hx lung cancer, HTN, hypothyroidism          Interval/Overnight Events/ ROS  Pt remained hemodynamically stable overnight, not on any pressors or inotropes. OOB to chair, breathing comfortably with minimal  to moderate pain. Ambulated several times yesterday .  No SOB, no palpitations, no nausea/ no vomiting, no dizziness  A-line and oquendo d/obdulia         PAST MEDICAL & SURGICAL HISTORY:  Lung cancer  GERD (gastroesophageal reflux disease)  Solitary pulmonary nodule  Hypertension  Raynauds disease  Hypothyroid  Breast cyst: benign  Graves disease: s/p radioactive iodine ~10 years ago  OP (osteoporosis)  Migraines  Venous insufficiency  Hydradenitis: excision, ~17 years ago  H/O melanoma in situ: excision from left wrist  Status post cataract extraction: left eye done 10/5/2016 2017  H/O vein strippin, ,   H/O: lung cancer: Left Lower Lobectomy     Allergies    latex (Rash)  metal, nickel - rash, hives (Other)  No Known Drug Allergies            ***VITAL SIGNS:  Vital Signs Last 24 Hrs  T(C): 36.5 (10 Connor 2018 00:00), Max: 36.8 (2018 16:00)  T(F): 97.7 (10 Connor 2018 00:00), Max: 98.3 (2018 16:00)  HR: 72 (10 Connor 2018 00:00) (62 - 75)  BP: 127/56 (10 Connor 2018 00:00) (105/63 - 151/62)  BP(mean): 74 (10 Connor 2018 00:00) (66 - 83)  RR: 14 (10 Connor 2018 00:00) (10 - 23)  SpO2: 98% (10 Connor 2018 00:00) (93% - 100%)    I/Os:   I&O's Detail    2018 07:  -  2018 07:00  --------------------------------------------------------  IN:    IV PiggyBack: 100 mL    lactated ringers.: 510 mL  Total IN: 610 mL    OUT:    Chest Tube: 315 mL    Chest Tube: 30 mL    Indwelling Catheter - Urethral: 420 mL  Total OUT: 765 mL    Total NET: -155 mL      2018 07:  -  10 Connor 2018 00:57  --------------------------------------------------------  IN:    lactated ringers.: 360 mL    Oral Fluid: 960 mL  Total IN: 1320 mL    OUT:    Chest Tube: 310 mL    Voided: 1200 mL  Total OUT: 1510 mL    Total NET: -190 mL        =======================  MEDICATIONS  ===================  MEDICATIONS  (STANDING):  acetaminophen   Tablet. 650 milliGRAM(s) Oral every 6 hours  acetaminophen  IVPB. 1000 milliGRAM(s) IV Intermittent once  aspirin enteric coated 81 milliGRAM(s) Oral daily  docusate sodium 100 milliGRAM(s) Oral three times a day  heparin  Injectable 5000 Unit(s) SubCutaneous every 8 hours  hydrochlorothiazide 12.5 milliGRAM(s) Oral daily  levothyroxine 75 MICROGram(s) Oral every other day  levothyroxine 88 MICROGram(s) Oral every other day  losartan 25 milliGRAM(s) Oral daily  pantoprazole    Tablet 40 milliGRAM(s) Oral before breakfast    MEDICATIONS  (PRN):  artificial  tears Solution 1 Drop(s) Both EYES four times a day PRN Dry Eyes  naloxone Injectable 0.1 milliGRAM(s) IV Push every 3 minutes PRN For ANY of the following changes in patient status:  A. RR LESS THAN 10 breaths per minute, B. Oxygen saturation LESS THAN 90%, C. Sedation score of 6  ondansetron Injectable 4 milliGRAM(s) IV Push every 6 hours PRN Nausea  oxyCODONE    IR 5 milliGRAM(s) Oral every 3 hours PRN Moderate and Severe Pain (4 - 10)        =================== PATIENT CARE ACCESS DEVICES ==========  Peripheral IV (+)    ======================= PHYSICAL EXAM===================  General:            Comfortable, Awake, alert, not in any distress  Neuro:              Moving all extremities to commands. No focal deficits	  HEENT:             TANK  Respiratory:	Lungs clear on auscultation bilaterally with good aeration.                           No rales, rhonchi, no wheezing. Effort even and unlabored.                          Right chest tube site - clean, on suction; no air leak  CV:		Regular rate and rhythm. Normal S1/S2. No murmurs  Abdomen:         Soft,  nontender, not-distended. Bowel sounds present   Skin:		No rash.  Extremities:	Warm, no cyanosis or edema.  Palpable pulses    ============================ LABS =======================                        11.3   9.14  )-----------( 241      ( 2018 03:45 )             33.7         138  |  103  |  16  ----------------------------<  109<H>  3.8   |  26  |  0.66    Ca    7.5<L>      2018 03:45    TPro  6.2  /  Alb  3.5  /  TBili  0.4  /  DBili  x   /  AST  26  /  ALT  18  /  AlkPhos  36<L>  09    LIVER FUNCTIONS - ( 2018 03:45 )  Alb: 3.5 g/dL / Pro: 6.2 g/dL / ALK PHOS: 36 u/L / ALT: 18 u/L / AST: 26 u/L / GGT: x               ===================== IMAGING STUDIES ===================  Radiology personally reviewed.    < from: Xray Chest 1 View AP -PORTABLE-Routine (18 @ 06:22) >  EXAM:  XR CHEST AP OR PA 1V    PROCEDURE DATE:  2018     INTERPRETATION:  TIME OF EXAM: 2018 at 6:26 PM.  CLINICAL INFORMATION: Status post right lower lobectomy. Chest tube   placement.    COMPARISON:  None available    TECHNIQUE:   AP Portable chest x-ray.    INTERPRETATION:     The heart is not enlarged.  There is right lung volume loss with postsurgical change.  Tip of right chest tube overlies the upper right chest.  No pneumothorax seen.  This right subcutaneous emphysema.  Left lung postsurgical change with surgical clips is seen.  There is platelike atelectasis at the left base.  No pleural effusion is seen.  Visualized bony structures appear intact.        AP portable chest x-ray from 2018 at 6:06 AM:    Right chest tube is slightly more inferior in position.  Small right apical pneumothorax is now seen.  Right subcutaneous emphysema not significantly changed.  Increased left basilar atelectasis.      IMPRESSION:  Right lung volume loss with postsurgical change and right   chest tube which is more inferior on the most recent image.     Small right apical pneumothorax.  Stable small right subcutaneous emphysema.  Increasing left basilar subsegmental atelectasis.        < end of copied text >    ====================ASSESSMENT AND PLAN ================  67 y. F with  Hx lung cancer - LLL lobectomy , new RLL nodule who is now s/p 18  Right lower lobectomy with mediastinal lymph node dissection      Issues: postop pain             right chest tube in place             right apical PTX             hx lung cancer, HTN, hypothyroidism        ====================== NEUROLOGY=======================  Pain control with PRN Tylenol IV / Toradol / Oxycodone      ==================== RESPIRATORY========================  Pt is on      2      L nasal canula   Comfortable, no evidence of distress.  Using incentive spirometry & doing                ml  Monitor chest tube output and air leak  Chest tube to suction 	  Continue bronchodilators, pulmonary toilet  Head of bed elevation to 30-40 degrees  OOB, ambulation    ====================CARDIOVASCULAR=====================  Continue hemodynamic monitoring/ telemetry  Not on any pressors  Continue cardiovascular / antihypertensive medications : ASA, HCTZ, Losartan    ===================== RENAL ============================  Continue LR 30CC/hr     Monitor I/Os, BUN/ Cr  and electrolytes    ==================== GASTROINTESTINAL===================  On  DASH diet, tolerating well  Continue GI prophylaxis with Protonix  Continue Zofran  for nausea - PRN	    =======================    ENDOCRIN  =====================  Glycemic monitoring  F/S with coverage    ===================HEMATOLOGIC/ONCOLOGIC =============  Monitor chest tube output. No signs of active bleeding.   Follow CBC, coags  in AM  DVT prophylaxis with SCD, sc Heparin    ========================INFECTIOUS DISEASE===============  No signs of infection. Monitor for fever / leukocytosis.  All surgical incision / chest tube  sites look clean        Pertinent clinical, laboratory, radiographic, hemodynamic, echocardiographic, respiratory data, microbiologic data and chart were reviewed and analyzed frequently throughout the course of the day and night. GI and DVT prophylaxis, glycemic control, head of bed elevation and skin care issues were addressed.  Patient seen, examined and plan discussed with CT Surgery / CTICU team during rounds.  Pt remains critically ill in imminent risk of  deterioration and requires very careful cardio- pulmonary monitoring and support.    I have spent      35   minutes of critical care time with this pt between    12   am   and    7  am         minutes spent on total encounter; more than 50% of the visit was spent counseling and/or coordinating care by the attending physician.        COURTNEY Miller MD JOY BAILON          MRN-[id number]    HPI:  67 year old female  with diagnosis of solitary pulmonary nodule scheduled for right video assisted thoracoscopy, right lower lobectomy, mediastinal lymph node dissection for 18. Pt with Hx of lung cancer s/p left lower lobectomy in , found a new right lower lobe nodule on imaging, referred for surgical evaluation. Pt denies weight loss, cough, SOB, hemoptysis, of fever. (29 Dec 2017 15:18)      Procedure: 18  Right lower lobectomy with mediastinal lymph node dissection  POD # : 2    Issues: postop pain             right chest tube in place             right apical PTX             hx lung cancer, HTN, hypothyroidism          Interval/Overnight Events/ ROS  Pt remained hemodynamically stable overnight, not on any pressors or inotropes. OOB to chair, breathing comfortably with minimal  to moderate pain. Ambulated several times yesterday .  No SOB, no palpitations, no nausea/ no vomiting, no dizziness  A-line and oquendo d/obdulia         PAST MEDICAL & SURGICAL HISTORY:  Lung cancer  GERD (gastroesophageal reflux disease)  Solitary pulmonary nodule  Hypertension  Raynauds disease  Hypothyroid  Breast cyst: benign  Graves disease: s/p radioactive iodine ~10 years ago  OP (osteoporosis)  Migraines  Venous insufficiency  Hydradenitis: excision, ~17 years ago  H/O melanoma in situ: excision from left wrist  Status post cataract extraction: left eye done 10/5/2016 2017  H/O vein strippin, ,   H/O: lung cancer: Left Lower Lobectomy     Allergies    latex (Rash)  metal, nickel - rash, hives (Other)  No Known Drug Allergies            ***VITAL SIGNS:  Vital Signs Last 24 Hrs  T(C): 36.5 (10 Connor 2018 00:00), Max: 36.8 (2018 16:00)  T(F): 97.7 (10 Connor 2018 00:00), Max: 98.3 (2018 16:00)  HR: 72 (10 Connor 2018 00:00) (62 - 75)  BP: 127/56 (10 Connor 2018 00:00) (105/63 - 151/62)  BP(mean): 74 (10 Connor 2018 00:00) (66 - 83)  RR: 14 (10 Connor 2018 00:00) (10 - 23)  SpO2: 98% (10 Connor 2018 00:00) (93% - 100%)    I/Os:   I&O's Detail    2018 07:  -  2018 07:00  --------------------------------------------------------  IN:    IV PiggyBack: 100 mL    lactated ringers.: 510 mL  Total IN: 610 mL    OUT:    Chest Tube: 315 mL    Chest Tube: 30 mL    Indwelling Catheter - Urethral: 420 mL  Total OUT: 765 mL    Total NET: -155 mL      2018 07:  -  10 Connor 2018 00:57  --------------------------------------------------------  IN:    lactated ringers.: 360 mL    Oral Fluid: 960 mL  Total IN: 1320 mL    OUT:    Chest Tube: 310 mL    Voided: 1200 mL  Total OUT: 1510 mL    Total NET: -190 mL        =======================  MEDICATIONS  ===================  MEDICATIONS  (STANDING):  acetaminophen   Tablet. 650 milliGRAM(s) Oral every 6 hours  acetaminophen  IVPB. 1000 milliGRAM(s) IV Intermittent once  aspirin enteric coated 81 milliGRAM(s) Oral daily  docusate sodium 100 milliGRAM(s) Oral three times a day  heparin  Injectable 5000 Unit(s) SubCutaneous every 8 hours  hydrochlorothiazide 12.5 milliGRAM(s) Oral daily  levothyroxine 75 MICROGram(s) Oral every other day  levothyroxine 88 MICROGram(s) Oral every other day  losartan 25 milliGRAM(s) Oral daily  pantoprazole    Tablet 40 milliGRAM(s) Oral before breakfast    MEDICATIONS  (PRN):  artificial  tears Solution 1 Drop(s) Both EYES four times a day PRN Dry Eyes  naloxone Injectable 0.1 milliGRAM(s) IV Push every 3 minutes PRN For ANY of the following changes in patient status:  A. RR LESS THAN 10 breaths per minute, B. Oxygen saturation LESS THAN 90%, C. Sedation score of 6  ondansetron Injectable 4 milliGRAM(s) IV Push every 6 hours PRN Nausea  oxyCODONE    IR 5 milliGRAM(s) Oral every 3 hours PRN Moderate and Severe Pain (4 - 10)        =================== PATIENT CARE ACCESS DEVICES ==========  Peripheral IV (+)    ======================= PHYSICAL EXAM===================  General:            Comfortable, Awake, alert, not in any distress  Neuro:              Moving all extremities to commands. No focal deficits	  HEENT:             TANK  Respiratory:	Lungs clear on auscultation bilaterally with good aeration.                           No rales, rhonchi, no wheezing. Effort even and unlabored.                          Right chest tube site - clean, on suction; (+)  air leak  CV:		Regular rate and rhythm. Normal S1/S2. No murmurs  Abdomen:         Soft,  nontender, not-distended. Bowel sounds present   Skin:		No rash.  Extremities:	Warm, no cyanosis or edema.  Palpable pulses    ============================ LABS =======================                        11.3   9.14  )-----------( 241      ( 2018 03:45 )             33.7         138  |  103  |  16  ----------------------------<  109<H>  3.8   |  26  |  0.66    Ca    7.5<L>      2018 03:45    TPro  6.2  /  Alb  3.5  /  TBili  0.4  /  DBili  x   /  AST  26  /  ALT  18  /  AlkPhos  36<L>  09    LIVER FUNCTIONS - ( 2018 03:45 )  Alb: 3.5 g/dL / Pro: 6.2 g/dL / ALK PHOS: 36 u/L / ALT: 18 u/L / AST: 26 u/L / GGT: x               ===================== IMAGING STUDIES ===================  Radiology personally reviewed.    < from: Xray Chest 1 View AP -PORTABLE-Routine (18 @ 06:22) >  EXAM:  XR CHEST AP OR PA 1V    PROCEDURE DATE:  2018     INTERPRETATION:  TIME OF EXAM: 2018 at 6:26 PM.  CLINICAL INFORMATION: Status post right lower lobectomy. Chest tube   placement.    COMPARISON:  None available    TECHNIQUE:   AP Portable chest x-ray.    INTERPRETATION:     The heart is not enlarged.  There is right lung volume loss with postsurgical change.  Tip of right chest tube overlies the upper right chest.  No pneumothorax seen.  This right subcutaneous emphysema.  Left lung postsurgical change with surgical clips is seen.  There is platelike atelectasis at the left base.  No pleural effusion is seen.  Visualized bony structures appear intact.        AP portable chest x-ray from 2018 at 6:06 AM:    Right chest tube is slightly more inferior in position.  Small right apical pneumothorax is now seen.  Right subcutaneous emphysema not significantly changed.  Increased left basilar atelectasis.      IMPRESSION:  Right lung volume loss with postsurgical change and right   chest tube which is more inferior on the most recent image.     Small right apical pneumothorax.  Stable small right subcutaneous emphysema.  Increasing left basilar subsegmental atelectasis.        < end of copied text >    ====================ASSESSMENT AND PLAN ================  67 y. F with  Hx lung cancer - LLL lobectomy , new RLL nodule who is now s/p 18  Right lower lobectomy with mediastinal lymph node dissection      Issues: postop pain             right chest tube in place             right apical PTX             hx lung cancer, HTN, hypothyroidism        ====================== NEUROLOGY=======================  Pain control with PRN Tylenol IV / Toradol / Oxycodone      ==================== RESPIRATORY========================  Pt is on      2      L nasal canula   Comfortable, no evidence of distress.  Using incentive spirometry   Monitor chest tube output and air leak  Chest tube to suction 	  Continue bronchodilators, pulmonary toilet  Head of bed elevation to 30-40 degrees  OOB, ambulation    ====================CARDIOVASCULAR=====================  Continue hemodynamic monitoring/ telemetry  Not on any pressors  Continue cardiovascular / antihypertensive medications : ASA, HCTZ, Losartan    ===================== RENAL ============================  Continue LR 30CC/hr     Monitor I/Os, BUN/ Cr  and electrolytes    ==================== GASTROINTESTINAL===================  On  DASH diet, tolerating well  Continue GI prophylaxis with Protonix  Continue Zofran  for nausea - PRN	    =======================    ENDOCRIN  =====================  Glycemic monitoring  F/S with coverage    ===================HEMATOLOGIC/ONCOLOGIC =============  Monitor chest tube output. No signs of active bleeding.   Follow CBC, coags  in AM  DVT prophylaxis with SCD, sc Heparin    ========================INFECTIOUS DISEASE===============  No signs of infection. Monitor for fever / leukocytosis.  All surgical incision / chest tube  sites look clean        Pertinent clinical, laboratory, radiographic, hemodynamic, echocardiographic, respiratory data, microbiologic data and chart were reviewed and analyzed frequently throughout the course of the day and night. GI and DVT prophylaxis, glycemic control, head of bed elevation and skin care issues were addressed.  Patient seen, examined and plan discussed with CT Surgery / CTICU team during rounds.  Pt remains critically ill in imminent risk of  deterioration and requires very careful cardio- pulmonary monitoring and support.    I have spent      35   minutes of critical care time with this pt between    12   am   and    7  am         minutes spent on total encounter; more than 50% of the visit was spent counseling and/or coordinating care by the attending physician.        COURTNEY Miller MD JOY BAILON          MRN-[id number]    HPI:  67 year old female  with diagnosis of solitary pulmonary nodule scheduled for right video assisted thoracoscopy, right lower lobectomy, mediastinal lymph node dissection for 18. Pt with Hx of lung cancer s/p left lower lobectomy in , found a new right lower lobe nodule on imaging, referred for surgical evaluation. Pt denies weight loss, cough, SOB, hemoptysis, of fever. (29 Dec 2017 15:18)      Procedure: 18  Right lower lobectomy with mediastinal lymph node dissection  POD # : 2    Issues: postop pain             right chest tube in place             right apical PTX             hx lung cancer, HTN, hypothyroidism          Interval/Overnight Events/ ROS  Pt remained hemodynamically stable overnight, not on any pressors or inotropes. OOB to chair, breathing comfortably with minimal  to moderate pain. Ambulated several times yesterday .  No SOB, no palpitations, no nausea/ no vomiting, no dizziness  A-line and oquendo d/obdulia         PAST MEDICAL & SURGICAL HISTORY:  Lung cancer  GERD (gastroesophageal reflux disease)  Solitary pulmonary nodule  Hypertension  Raynauds disease  Hypothyroid  Breast cyst: benign  Graves disease: s/p radioactive iodine ~10 years ago  OP (osteoporosis)  Migraines  Venous insufficiency  Hydradenitis: excision, ~17 years ago  H/O melanoma in situ: excision from left wrist  Status post cataract extraction: left eye done 10/5/2016 2017  H/O vein strippin, ,   H/O: lung cancer: Left Lower Lobectomy     Allergies    latex (Rash)  metal, nickel - rash, hives (Other)  No Known Drug Allergies            ***VITAL SIGNS:  Vital Signs Last 24 Hrs  T(C): 36.5 (10 Connor 2018 00:00), Max: 36.8 (2018 16:00)  T(F): 97.7 (10 Connor 2018 00:00), Max: 98.3 (2018 16:00)  HR: 72 (10 Connor 2018 00:00) (62 - 75)  BP: 127/56 (10 Connor 2018 00:00) (105/63 - 151/62)  BP(mean): 74 (10 Connor 2018 00:00) (66 - 83)  RR: 14 (10 Connor 2018 00:00) (10 - 23)  SpO2: 98% (10 Connor 2018 00:00) (93% - 100%)    I/Os:   I&O's Detail    2018 07:  -  2018 07:00  --------------------------------------------------------  IN:    IV PiggyBack: 100 mL    lactated ringers.: 510 mL  Total IN: 610 mL    OUT:    Chest Tube: 315 mL    Chest Tube: 30 mL    Indwelling Catheter - Urethral: 420 mL  Total OUT: 765 mL    Total NET: -155 mL      2018 07:  -  10 Connor 2018 00:57  --------------------------------------------------------  IN:    lactated ringers.: 360 mL    Oral Fluid: 960 mL  Total IN: 1320 mL    OUT:    Chest Tube: 310 mL    Voided: 1200 mL  Total OUT: 1510 mL    Total NET: -190 mL        =======================  MEDICATIONS  ===================  MEDICATIONS  (STANDING):  acetaminophen   Tablet. 650 milliGRAM(s) Oral every 6 hours  acetaminophen  IVPB. 1000 milliGRAM(s) IV Intermittent once  aspirin enteric coated 81 milliGRAM(s) Oral daily  docusate sodium 100 milliGRAM(s) Oral three times a day  heparin  Injectable 5000 Unit(s) SubCutaneous every 8 hours  hydrochlorothiazide 12.5 milliGRAM(s) Oral daily  levothyroxine 75 MICROGram(s) Oral every other day  levothyroxine 88 MICROGram(s) Oral every other day  losartan 25 milliGRAM(s) Oral daily  pantoprazole    Tablet 40 milliGRAM(s) Oral before breakfast    MEDICATIONS  (PRN):  artificial  tears Solution 1 Drop(s) Both EYES four times a day PRN Dry Eyes  naloxone Injectable 0.1 milliGRAM(s) IV Push every 3 minutes PRN For ANY of the following changes in patient status:  A. RR LESS THAN 10 breaths per minute, B. Oxygen saturation LESS THAN 90%, C. Sedation score of 6  ondansetron Injectable 4 milliGRAM(s) IV Push every 6 hours PRN Nausea  oxyCODONE    IR 5 milliGRAM(s) Oral every 3 hours PRN Moderate and Severe Pain (4 - 10)        =================== PATIENT CARE ACCESS DEVICES ==========  Peripheral IV (+)    ======================= PHYSICAL EXAM===================  General:            Comfortable, Awake, alert, not in any distress  Neuro:              Moving all extremities to commands. No focal deficits	  HEENT:             TANK  Respiratory:	Lungs clear on auscultation bilaterally with good aeration.                           No rales, rhonchi, no wheezing. Effort even and unlabored.                          Right chest tube site - clean, on suction; (+)  air leak  CV:		Regular rate and rhythm. Normal S1/S2. No murmurs  Abdomen:         Soft,  nontender, not-distended. Bowel sounds present   Skin:		No rash.  Extremities:	Warm, no cyanosis or edema.  Palpable pulses    ============================ LABS =======================                        11.3   9.14  )-----------( 241      ( 2018 03:45 )             33.7         138  |  103  |  16  ----------------------------<  109<H>  3.8   |  26  |  0.66    Ca    7.5<L>      2018 03:45    TPro  6.2  /  Alb  3.5  /  TBili  0.4  /  DBili  x   /  AST  26  /  ALT  18  /  AlkPhos  36<L>  09    LIVER FUNCTIONS - ( 2018 03:45 )  Alb: 3.5 g/dL / Pro: 6.2 g/dL / ALK PHOS: 36 u/L / ALT: 18 u/L / AST: 26 u/L / GGT: x               ===================== IMAGING STUDIES ===================  Radiology personally reviewed.    < from: Xray Chest 1 View AP -PORTABLE-Routine (18 @ 06:22) >  EXAM:  XR CHEST AP OR PA 1V    PROCEDURE DATE:  2018     INTERPRETATION:  TIME OF EXAM: 2018 at 6:26 PM.  CLINICAL INFORMATION: Status post right lower lobectomy. Chest tube   placement.    COMPARISON:  None available    TECHNIQUE:   AP Portable chest x-ray.    INTERPRETATION:     The heart is not enlarged.  There is right lung volume loss with postsurgical change.  Tip of right chest tube overlies the upper right chest.  No pneumothorax seen.  This right subcutaneous emphysema.  Left lung postsurgical change with surgical clips is seen.  There is platelike atelectasis at the left base.  No pleural effusion is seen.  Visualized bony structures appear intact.        AP portable chest x-ray from 2018 at 6:06 AM:    Right chest tube is slightly more inferior in position.  Small right apical pneumothorax is now seen.  Right subcutaneous emphysema not significantly changed.  Increased left basilar atelectasis.      IMPRESSION:  Right lung volume loss with postsurgical change and right   chest tube which is more inferior on the most recent image.     Small right apical pneumothorax.  Stable small right subcutaneous emphysema.  Increasing left basilar subsegmental atelectasis.        < end of copied text >    ====================ASSESSMENT AND PLAN ================  67 y. F with  Hx lung cancer - LLL lobectomy , new RLL nodule who is now s/p 18  Right lower lobectomy with mediastinal lymph node dissection      Issues: postop pain             right chest tube in place             right apical PTX             hx lung cancer, HTN, hypothyroidism        ====================== NEUROLOGY=======================  Pain control with PRN Tylenol IV / Toradol / Oxycodone      ==================== RESPIRATORY========================  Pt is on      2      L nasal canula   Comfortable, no evidence of distress.  Using incentive spirometry   Monitor chest tube output and air leak  Chest tube to suction 	  Continue bronchodilators, pulmonary toilet  Head of bed elevation to 30-40 degrees  OOB, ambulation    ====================CARDIOVASCULAR=====================  Continue hemodynamic monitoring/ telemetry  Not on any pressors  Continue cardiovascular / antihypertensive medications : ASA, HCTZ, Losartan    ===================== RENAL ============================  Continue LR 30CC/hr     Monitor I/Os, BUN/ Cr  and electrolytes    ==================== GASTROINTESTINAL===================  On  DASH diet, tolerating well  Continue GI prophylaxis with Protonix  Continue Zofran  for nausea - PRN	    =======================    ENDOCRIN  =====================  Glycemic monitoring  F/S with coverage  PO Synthroid for hypothyroidism    ===================HEMATOLOGIC/ONCOLOGIC =============  Monitor chest tube output. No signs of active bleeding.   Follow CBC, coags  in AM  DVT prophylaxis with SCD, sc Heparin    ========================INFECTIOUS DISEASE===============  No signs of infection. Monitor for fever / leukocytosis.  All surgical incision / chest tube  sites look clean        Pertinent clinical, laboratory, radiographic, hemodynamic, echocardiographic, respiratory data, microbiologic data and chart were reviewed and analyzed frequently throughout the course of the day and night. GI and DVT prophylaxis, glycemic control, head of bed elevation and skin care issues were addressed.  Patient seen, examined and plan discussed with CT Surgery / CTICU team during rounds.  Pt remains critically ill in imminent risk of  deterioration and requires very careful cardio- pulmonary monitoring and support.    I have spent      35   minutes of critical care time with this pt between    12   am   and    7  am         minutes spent on total encounter; more than 50% of the visit was spent counseling and/or coordinating care by the attending physician.        COURTNEY Miller MD JOY BAILON          MRN-[id number]    HPI:  67 year old female  with diagnosis of solitary pulmonary nodule scheduled for right video assisted thoracoscopy, right lower lobectomy, mediastinal lymph node dissection for 18. Pt with Hx of lung cancer s/p left lower lobectomy in , found a new right lower lobe nodule on imaging, referred for surgical evaluation. Pt denies weight loss, cough, SOB, hemoptysis, of fever. (29 Dec 2017 15:18)      Procedure: 18  Right lower lobectomy with mediastinal lymph node dissection  POD # : 2    Issues: postop pain             right chest tube in place             right apical PTX             hx lung cancer, HTN, hypothyroidism          Interval/Overnight Events/ ROS  Pt remained hemodynamically stable overnight, not on any pressors or inotropes. OOB to chair, breathing comfortably with minimal  to moderate pain. Ambulated several times yesterday .  No SOB, no palpitations, no nausea/ no vomiting, no dizziness  A-line and oquendo d/obdulia         PAST MEDICAL & SURGICAL HISTORY:  Lung cancer  GERD (gastroesophageal reflux disease)  Solitary pulmonary nodule  Hypertension  Raynauds disease  Hypothyroid  Breast cyst: benign  Graves disease: s/p radioactive iodine ~10 years ago  OP (osteoporosis)  Migraines  Venous insufficiency  Hydradenitis: excision, ~17 years ago  H/O melanoma in situ: excision from left wrist  Status post cataract extraction: left eye done 10/5/2016 2017  H/O vein strippin, ,   H/O: lung cancer: Left Lower Lobectomy     Allergies    latex (Rash)  metal, nickel - rash, hives (Other)  No Known Drug Allergies            ***VITAL SIGNS:  Vital Signs Last 24 Hrs  T(C): 36.5 (10 Connor 2018 00:00), Max: 36.8 (2018 16:00)  T(F): 97.7 (10 Connor 2018 00:00), Max: 98.3 (2018 16:00)  HR: 72 (10 Connor 2018 00:00) (62 - 75)  BP: 127/56 (10 Connor 2018 00:00) (105/63 - 151/62)  BP(mean): 74 (10 Connor 2018 00:00) (66 - 83)  RR: 14 (10 Connor 2018 00:00) (10 - 23)  SpO2: 98% (10 Connor 2018 00:00) (93% - 100%)    I/Os:   I&O's Detail    2018 07:  -  2018 07:00  --------------------------------------------------------  IN:    IV PiggyBack: 100 mL    lactated ringers.: 510 mL  Total IN: 610 mL    OUT:    Chest Tube: 315 mL    Chest Tube: 30 mL    Indwelling Catheter - Urethral: 420 mL  Total OUT: 765 mL    Total NET: -155 mL      2018 07:  -  10 Connor 2018 00:57  --------------------------------------------------------  IN:    lactated ringers.: 360 mL    Oral Fluid: 960 mL  Total IN: 1320 mL    OUT:    Chest Tube: 310 mL    Voided: 1200 mL  Total OUT: 1510 mL    Total NET: -190 mL        =======================  MEDICATIONS  ===================  MEDICATIONS  (STANDING):  acetaminophen   Tablet. 650 milliGRAM(s) Oral every 6 hours  acetaminophen  IVPB. 1000 milliGRAM(s) IV Intermittent once  aspirin enteric coated 81 milliGRAM(s) Oral daily  docusate sodium 100 milliGRAM(s) Oral three times a day  heparin  Injectable 5000 Unit(s) SubCutaneous every 8 hours  hydrochlorothiazide 12.5 milliGRAM(s) Oral daily  levothyroxine 75 MICROGram(s) Oral every other day  levothyroxine 88 MICROGram(s) Oral every other day  losartan 25 milliGRAM(s) Oral daily  pantoprazole    Tablet 40 milliGRAM(s) Oral before breakfast    MEDICATIONS  (PRN):  artificial  tears Solution 1 Drop(s) Both EYES four times a day PRN Dry Eyes  naloxone Injectable 0.1 milliGRAM(s) IV Push every 3 minutes PRN For ANY of the following changes in patient status:  A. RR LESS THAN 10 breaths per minute, B. Oxygen saturation LESS THAN 90%, C. Sedation score of 6  ondansetron Injectable 4 milliGRAM(s) IV Push every 6 hours PRN Nausea  oxyCODONE    IR 5 milliGRAM(s) Oral every 3 hours PRN Moderate and Severe Pain (4 - 10)        =================== PATIENT CARE ACCESS DEVICES ==========  Peripheral IV (+)    ======================= PHYSICAL EXAM===================  General:            Comfortable, Awake, alert, not in any distress  Neuro:              Moving all extremities to commands. No focal deficits	  HEENT:             TANK  Respiratory:	Lungs clear on auscultation bilaterally with good aeration.                           No rales, rhonchi, no wheezing. Effort even and unlabored.                          Right chest tube site - clean, on suction; (+)  air leak  CV:		Regular rate and rhythm. Normal S1/S2. No murmurs  Abdomen:         Soft,  nontender, not-distended. Bowel sounds present   Skin:		No rash.  Extremities:	Warm, no cyanosis or edema.  Palpable pulses    ============================ LABS =======================                             11.1   8.18  )-----------( 257      ( 10 Connor 2018 03:00 )             34.0                    11.3   9.14  )-----------( 241      ( 2018 03:45 )             33.7     01-10    137       |  100       |  10  --------------------------------<  121<H>  3.3<L>   |  27       |  0.62    Ca    7.7<L>      10 Connor 2018 03:00  Phos  1.7     01-10  Mg     2.0     01-10    TPro  6.2  /  Alb  3.5  /  TBili  0.4  /  DBili  x   /  AST  26  /  ALT  18  /  AlkPhos  36<L>      138  |  103  |  16  ----------------------------<  109<H>  3.8   |  26  |  0.66    Ca    7.5<L>      2018 03:45    TPro  6.2  /  Alb  3.5  /  TBili  0.4  /  DBili  x   /  AST  26  /  ALT  18  /  AlkPhos  36<L>      LIVER FUNCTIONS - ( 2018 03:45 )  Alb: 3.5 g/dL / Pro: 6.2 g/dL / ALK PHOS: 36 u/L / ALT: 18 u/L / AST: 26 u/L / GGT: x               ===================== IMAGING STUDIES ===================  Radiology personally reviewed.    < from: Xray Chest 1 View AP -PORTABLE-Routine (18 @ 06:22) >  EXAM:  XR CHEST AP OR PA 1V    PROCEDURE DATE:  2018     INTERPRETATION:  TIME OF EXAM: 2018 at 6:26 PM.  CLINICAL INFORMATION: Status post right lower lobectomy. Chest tube   placement.    COMPARISON:  None available    TECHNIQUE:   AP Portable chest x-ray.    INTERPRETATION:     The heart is not enlarged.  There is right lung volume loss with postsurgical change.  Tip of right chest tube overlies the upper right chest.  No pneumothorax seen.  This right subcutaneous emphysema.  Left lung postsurgical change with surgical clips is seen.  There is platelike atelectasis at the left base.  No pleural effusion is seen.  Visualized bony structures appear intact.        AP portable chest x-ray from 2018 at 6:06 AM:    Right chest tube is slightly more inferior in position.  Small right apical pneumothorax is now seen.  Right subcutaneous emphysema not significantly changed.  Increased left basilar atelectasis.      IMPRESSION:  Right lung volume loss with postsurgical change and right   chest tube which is more inferior on the most recent image.     Small right apical pneumothorax.  Stable small right subcutaneous emphysema.  Increasing left basilar subsegmental atelectasis.        < end of copied text >    ====================ASSESSMENT AND PLAN ================  67 y. F with  Hx lung cancer - LLL lobectomy , new RLL nodule who is now s/p 18  Right lower lobectomy with mediastinal lymph node dissection      Issues: postop pain             right chest tube in place             right apical PTX             hypokalemia/hypophosphatemia - supplemented             hx lung cancer, HTN, hypothyroidism        ====================== NEUROLOGY=======================  Pain control with PRN Tylenol IV / Toradol / Oxycodone      ==================== RESPIRATORY========================  Pt is on      2      L nasal canula   Comfortable, no evidence of distress.  Using incentive spirometry   Monitor chest tube output and air leak  Chest tube to suction 	  Continue bronchodilators, pulmonary toilet  Head of bed elevation to 30-40 degrees  OOB, ambulation    ====================CARDIOVASCULAR=====================  Continue hemodynamic monitoring/ telemetry  Not on any pressors  Continue cardiovascular / antihypertensive medications : ASA, HCTZ, Losartan    ===================== RENAL ============================  Continue LR 30CC/hr     Monitor I/Os, BUN/ Cr  and electrolytes    ==================== GASTROINTESTINAL===================  On  DASH diet, tolerating well  Continue GI prophylaxis with Protonix  Continue Zofran  for nausea - PRN	    =======================    ENDOCRIN  =====================  Glycemic monitoring  F/S with coverage  PO Synthroid for hypothyroidism    ===================HEMATOLOGIC/ONCOLOGIC =============  Monitor chest tube output. No signs of active bleeding.   Follow CBC, coags  in AM  DVT prophylaxis with SCD, sc Heparin    ========================INFECTIOUS DISEASE===============  No signs of infection. Monitor for fever / leukocytosis.  All surgical incision / chest tube  sites look clean        Pertinent clinical, laboratory, radiographic, hemodynamic, echocardiographic, respiratory data, microbiologic data and chart were reviewed and analyzed frequently throughout the course of the day and night. GI and DVT prophylaxis, glycemic control, head of bed elevation and skin care issues were addressed.  Patient seen, examined and plan discussed with CT Surgery / CTICU team during rounds.  Pt remains critically ill in imminent risk of  deterioration and requires very careful cardio- pulmonary monitoring and support.    I have spent      35   minutes of critical care time with this pt between    12   am   and    7  am         minutes spent on total encounter; more than 50% of the visit was spent counseling and/or coordinating care by the attending physician.        COURTNEY Miller MD

## 2018-01-11 ENCOUNTER — TRANSCRIPTION ENCOUNTER (OUTPATIENT)
Age: 68
End: 2018-01-11

## 2018-01-11 VITALS
OXYGEN SATURATION: 98 % | HEART RATE: 74 BPM | SYSTOLIC BLOOD PRESSURE: 154 MMHG | RESPIRATION RATE: 18 BRPM | DIASTOLIC BLOOD PRESSURE: 74 MMHG

## 2018-01-11 LAB
BUN SERPL-MCNC: 12 MG/DL — SIGNIFICANT CHANGE UP (ref 7–23)
CALCIUM SERPL-MCNC: 8.2 MG/DL — LOW (ref 8.4–10.5)
CHLORIDE SERPL-SCNC: 100 MMOL/L — SIGNIFICANT CHANGE UP (ref 98–107)
CO2 SERPL-SCNC: 30 MMOL/L — SIGNIFICANT CHANGE UP (ref 22–31)
CREAT SERPL-MCNC: 0.67 MG/DL — SIGNIFICANT CHANGE UP (ref 0.5–1.3)
GLUCOSE SERPL-MCNC: 98 MG/DL — SIGNIFICANT CHANGE UP (ref 70–99)
POTASSIUM SERPL-MCNC: 4.1 MMOL/L — SIGNIFICANT CHANGE UP (ref 3.5–5.3)
POTASSIUM SERPL-SCNC: 4.1 MMOL/L — SIGNIFICANT CHANGE UP (ref 3.5–5.3)
SODIUM SERPL-SCNC: 138 MMOL/L — SIGNIFICANT CHANGE UP (ref 135–145)

## 2018-01-11 PROCEDURE — 99238 HOSP IP/OBS DSCHRG MGMT 30/<: CPT

## 2018-01-11 PROCEDURE — 71045 X-RAY EXAM CHEST 1 VIEW: CPT | Mod: 26,76

## 2018-01-11 RX ORDER — ASPIRIN/CALCIUM CARB/MAGNESIUM 324 MG
1 TABLET ORAL
Qty: 0 | Refills: 0 | COMMUNITY
Start: 2018-01-11

## 2018-01-11 RX ORDER — LEVOTHYROXINE SODIUM 125 MCG
1 TABLET ORAL
Qty: 0 | Refills: 0 | COMMUNITY

## 2018-01-11 RX ORDER — IBUPROFEN 200 MG
400 TABLET ORAL EVERY 6 HOURS
Qty: 0 | Refills: 0 | Status: DISCONTINUED | OUTPATIENT
Start: 2018-01-11 | End: 2018-01-11

## 2018-01-11 RX ORDER — DOCUSATE SODIUM 100 MG
1 CAPSULE ORAL
Qty: 0 | Refills: 0 | COMMUNITY
Start: 2018-01-11

## 2018-01-11 RX ORDER — IBUPROFEN 200 MG
1 TABLET ORAL
Qty: 0 | Refills: 0 | COMMUNITY
Start: 2018-01-11

## 2018-01-11 RX ORDER — UBIDECARENONE 100 MG
1 CAPSULE ORAL
Qty: 0 | Refills: 0 | COMMUNITY

## 2018-01-11 RX ORDER — OMEGA-3 ACID ETHYL ESTERS 1 G
1 CAPSULE ORAL
Qty: 0 | Refills: 0 | COMMUNITY

## 2018-01-11 RX ORDER — OXYCODONE HYDROCHLORIDE 5 MG/1
2 TABLET ORAL
Qty: 60 | Refills: 0 | OUTPATIENT
Start: 2018-01-11 | End: 2018-01-15

## 2018-01-11 RX ORDER — ASPIRIN/CALCIUM CARB/MAGNESIUM 324 MG
1 TABLET ORAL
Qty: 0 | Refills: 0 | COMMUNITY

## 2018-01-11 RX ORDER — ACETAMINOPHEN 500 MG
2 TABLET ORAL
Qty: 0 | Refills: 0 | COMMUNITY

## 2018-01-11 RX ADMIN — Medication 75 MICROGRAM(S): at 07:48

## 2018-01-11 RX ADMIN — LOSARTAN POTASSIUM 25 MILLIGRAM(S): 100 TABLET, FILM COATED ORAL at 06:19

## 2018-01-11 RX ADMIN — Medication 100 MILLIGRAM(S): at 12:32

## 2018-01-11 RX ADMIN — Medication 12.5 MILLIGRAM(S): at 06:19

## 2018-01-11 RX ADMIN — OXYCODONE HYDROCHLORIDE 5 MILLIGRAM(S): 5 TABLET ORAL at 08:18

## 2018-01-11 RX ADMIN — Medication 100 MILLIGRAM(S): at 06:20

## 2018-01-11 RX ADMIN — OXYCODONE HYDROCHLORIDE 5 MILLIGRAM(S): 5 TABLET ORAL at 01:30

## 2018-01-11 RX ADMIN — Medication 650 MILLIGRAM(S): at 01:30

## 2018-01-11 RX ADMIN — Medication 400 MILLIGRAM(S): at 12:32

## 2018-01-11 RX ADMIN — PANTOPRAZOLE SODIUM 40 MILLIGRAM(S): 20 TABLET, DELAYED RELEASE ORAL at 06:19

## 2018-01-11 RX ADMIN — Medication 650 MILLIGRAM(S): at 00:34

## 2018-01-11 RX ADMIN — Medication 650 MILLIGRAM(S): at 07:25

## 2018-01-11 RX ADMIN — OXYCODONE HYDROCHLORIDE 5 MILLIGRAM(S): 5 TABLET ORAL at 10:34

## 2018-01-11 RX ADMIN — Medication 650 MILLIGRAM(S): at 06:20

## 2018-01-11 RX ADMIN — Medication 81 MILLIGRAM(S): at 12:32

## 2018-01-11 RX ADMIN — OXYCODONE HYDROCHLORIDE 5 MILLIGRAM(S): 5 TABLET ORAL at 00:35

## 2018-01-11 RX ADMIN — HEPARIN SODIUM 5000 UNIT(S): 5000 INJECTION INTRAVENOUS; SUBCUTANEOUS at 12:32

## 2018-01-11 RX ADMIN — HEPARIN SODIUM 5000 UNIT(S): 5000 INJECTION INTRAVENOUS; SUBCUTANEOUS at 06:19

## 2018-01-11 NOTE — DISCHARGE NOTE ADULT - PATIENT PORTAL LINK FT
“You can access the [facility] Patient Portal, offered by [facility], by registering with the following website: [email or web address]”

## 2018-01-11 NOTE — DISCHARGE NOTE ADULT - HOSPITAL COURSE
67 year old female presents to presurgical testing with diagnosis of solitary pulmonary nodule scheduled for right video assisted thoracoscopy, right lower lobectomy, mediastinal lymph node dissection for 1/8/18. Pt with Hx of lung cancer s/p left lower lobectomy in 2000, found a new right upper lobe nodule on imaging, referred for surgical evaluation. Pt denies weight loss, cough, SOB, hemoptysis, of fever.   On 1/8/18 pt. had a Left VATS, Lingula sparing KIMBER. post op w Sml air leak. Resolved today. CT removed, CXR showed no change in sml ptx. Pt. feels well. AMbulating frequently. VATS c/d/i. All labs and vital signs stable. Cleared for discharge as per Dr. Alvarez.

## 2018-01-11 NOTE — DISCHARGE NOTE ADULT - INSTRUCTIONS
follow up with MD as advised, take medication as ordered, gradually increase activity as tolerate, monitor incision for healing, call MD if any sign of infection, eat heart healthy diet You may resume your usual

## 2018-01-11 NOTE — DISCHARGE NOTE ADULT - MEDICATION SUMMARY - MEDICATIONS TO TAKE
I will START or STAY ON the medications listed below when I get home from the hospital:    B complex  (OTC)  --  in am  -- Indication: For Vitamin    ibuprofen 400 mg oral tablet  -- 1 tab(s) by mouth every 6 hours, As needed, If pt doesn't want narcotics for pain.  -- Indication: For pain    oxyCODONE 5 mg oral tablet  -- 2 tab(s) by mouth every 4 hours, As Needed  severe pain. Can take 1 for moderate. MDD:12  -- Indication: For pain    Tylenol 325 mg oral capsule  -- 2 cap(s) by mouth 3 times a day, As Needed for mild pain.   -- Indication: For pain    aspirin 81 mg oral delayed release tablet  -- 1 tab(s) by mouth once a day  -- Indication: For anti platelet    losartan 25 mg oral tablet  -- 1 tab(s) by mouth once a day in am  -- Indication: For blood pressure    Prolia 60 mg/mL subcutaneous solution  -- twice per year, last dose 10/2017  -- Indication: For bone reabsorption    hydroCHLOROthiazide 12.5 mg oral tablet  -- 1 tab(s) by mouth once a day in am  -- Indication: For diuretic    docusate sodium 100 mg oral capsule  -- 1 cap(s) by mouth 3 times a day  -- Indication: For constipation    Fish Oil oral capsule  -- 1 cap(s) by mouth once a day  -- Indication: For vitamin    CoQ10 300 mg oral capsule  -- 1 cap(s) by mouth once a day   -- Indication: For vitamin    Soothe preserved ophthalmic solution  -- 1 drop(s) to each affected eye 4 times a day  -- Indication: For eye gtt    pantoprazole 40 mg oral granule, delayed release  -- 1 each by mouth once a day in am  -- Indication: For Stomach protection    Synthroid 88 mcg (0.088 mg) oral tablet  -- 1 tab(s) by mouth every other day/ alternates with 75 mcg  as per    patient  -- Indication: For thyroid     Synthroid 75 mcg (0.075 mg) oral tablet  -- 1 tab(s) by mouth every other day/alternates with 88 mcg as instructed    -- Indication: For thyroid    Multiple Vitamins oral tablet  -- 1 tab(s) by mouth once a day   -- Indication: For vitamin    Calcium 600+D oral tablet  -- 1 tab(s) by mouth once a day  -- Indication: For vitamin

## 2018-01-11 NOTE — DISCHARGE NOTE ADULT - CARE PROVIDER_API CALL
Mina Alvarez), Thoracic Surgery  [id number] [place] [address]  Phone: [phone]  Fax: [phone]

## 2018-01-11 NOTE — DISCHARGE NOTE ADULT - NS AS ACTIVITY OBS
Walking-Outdoors allowed/Sex allowed/Stairs allowed/Do not drive or operate machinery/Showering allowed/No Heavy lifting/straining/Do not make important decisions/Walking-Indoors allowed

## 2018-01-11 NOTE — DISCHARGE NOTE ADULT - CARE PLAN
Principal Discharge DX:	Solitary pulmonary nodule  Goal:	s/p Lung resecton. Goal is improved healing, final pathology.  Instructions for follow-up, activity and diet:	Walk 4-5 x per day. Increase as tolerated. You may climb stairs. Continue to use incentive spirometer.   You may remove all dressings tonight and begin to shower. Leave wounds uncovered.   Suture will be removed in office.  See Dr. Alvarez in 7-10 days. Call for an apt. [phone]

## 2018-01-16 LAB — SURGICAL PATHOLOGY STUDY: SIGNIFICANT CHANGE UP

## 2018-01-22 ENCOUNTER — APPOINTMENT (OUTPATIENT)
Dept: THORACIC SURGERY | Facility: CLINIC | Age: 68
End: 2018-01-22
Payer: MEDICARE

## 2018-01-22 VITALS
HEIGHT: 64 IN | BODY MASS INDEX: 25.95 KG/M2 | HEART RATE: 68 BPM | RESPIRATION RATE: 17 BRPM | OXYGEN SATURATION: 99 % | WEIGHT: 152 LBS | SYSTOLIC BLOOD PRESSURE: 136 MMHG | DIASTOLIC BLOOD PRESSURE: 82 MMHG

## 2018-01-22 PROCEDURE — 99024 POSTOP FOLLOW-UP VISIT: CPT

## 2018-05-23 ENCOUNTER — APPOINTMENT (OUTPATIENT)
Dept: THORACIC SURGERY | Facility: CLINIC | Age: 68
End: 2018-05-23
Payer: MEDICARE

## 2018-05-23 VITALS
HEART RATE: 77 BPM | OXYGEN SATURATION: 100 % | DIASTOLIC BLOOD PRESSURE: 84 MMHG | SYSTOLIC BLOOD PRESSURE: 150 MMHG | WEIGHT: 157 LBS | RESPIRATION RATE: 17 BRPM | HEIGHT: 64 IN | BODY MASS INDEX: 26.8 KG/M2

## 2018-05-23 PROCEDURE — 99213 OFFICE O/P EST LOW 20 MIN: CPT

## 2018-11-28 ENCOUNTER — APPOINTMENT (OUTPATIENT)
Dept: THORACIC SURGERY | Facility: CLINIC | Age: 68
End: 2018-11-28
Payer: MEDICARE

## 2018-11-28 VITALS
OXYGEN SATURATION: 99 % | HEART RATE: 82 BPM | HEIGHT: 63 IN | DIASTOLIC BLOOD PRESSURE: 94 MMHG | RESPIRATION RATE: 14 BRPM | SYSTOLIC BLOOD PRESSURE: 148 MMHG

## 2018-11-28 PROCEDURE — 99213 OFFICE O/P EST LOW 20 MIN: CPT

## 2019-06-07 PROBLEM — R91.1 SOLITARY PULMONARY NODULE: Chronic | Status: ACTIVE | Noted: 2017-12-29

## 2019-06-07 PROBLEM — C34.90 MALIGNANT NEOPLASM OF UNSPECIFIED PART OF UNSPECIFIED BRONCHUS OR LUNG: Chronic | Status: ACTIVE | Noted: 2017-12-29

## 2019-06-07 PROBLEM — I10 ESSENTIAL (PRIMARY) HYPERTENSION: Chronic | Status: ACTIVE | Noted: 2017-12-29

## 2019-06-07 PROBLEM — K21.9 GASTRO-ESOPHAGEAL REFLUX DISEASE WITHOUT ESOPHAGITIS: Chronic | Status: ACTIVE | Noted: 2017-12-29

## 2019-06-10 ENCOUNTER — APPOINTMENT (OUTPATIENT)
Dept: THORACIC SURGERY | Facility: CLINIC | Age: 69
End: 2019-06-10
Payer: MEDICARE

## 2019-06-10 VITALS
TEMPERATURE: 97.9 F | BODY MASS INDEX: 26.22 KG/M2 | RESPIRATION RATE: 18 BRPM | HEART RATE: 75 BPM | OXYGEN SATURATION: 98 % | DIASTOLIC BLOOD PRESSURE: 85 MMHG | WEIGHT: 148 LBS | SYSTOLIC BLOOD PRESSURE: 136 MMHG

## 2019-06-10 PROCEDURE — 99213 OFFICE O/P EST LOW 20 MIN: CPT

## 2019-06-11 NOTE — ASSESSMENT
[FreeTextEntry1] : 68 year old female who presents today for a follow up appointment. She is s/p Bronchoscopy, Right VATS, Right Lower Lobectomy, Hilar and Mediastinal Lymph Node Sampling on 1/8/2018, pathology revealed stage IA, T1bN0 invasive adenocarcinoma, solid predominant with additional minor acinar pattern. \par \par CT Chest on 6/6/19: postop changes. Stable small nodules in the KIMBER ( 4mm and 2mm). PUlmonary emphysema. \par \par I have reviewed the patient's medical records and diagnostic images at the time of this office consultation and have made the following recommendation.\par Plan:\par 1. No evidence of recurrent disease. \par 2. RTC in 6 months with CT chest w/o contrast. \par \par \par \par \par Written by Vy Bro NP, acting as a scribe for Dr. Mina Alvarez.       \par “The documentation recorded by the scribe accurately reflects the service I personally performed and the decisions made by me.”   Mina Alvarez MD.\par \par \par

## 2019-06-11 NOTE — PHYSICAL EXAM
[Sclera] : the sclera and conjunctiva were normal [PERRL With Normal Accommodation] : pupils were equal in size, round, and reactive to light [Neck Appearance] : the appearance of the neck was normal [Respiration, Rhythm And Depth] : normal respiratory rhythm and effort [Heart Rate And Rhythm] : heart rate was normal and rhythm regular [Auscultation Breath Sounds / Voice Sounds] : lungs were clear to auscultation bilaterally [Heart Sounds] : normal S1 and S2 [Examination Of The Chest] : the chest was normal in appearance [2+] : left 2+ [No Pulse Delay] : no pulse delay [Abdomen Tenderness] : non-tender [Bowel Sounds] : normal bowel sounds [Abdomen Soft] : soft [Cervical Lymph Nodes Enlarged Anterior Bilaterally] : anterior cervical [Cervical Lymph Nodes Enlarged Posterior Bilaterally] : posterior cervical [Abnormal Walk] : normal gait [No CVA Tenderness] : no ~M costovertebral angle tenderness [Involuntary Movements] : no involuntary movements were seen [Skin Color & Pigmentation] : normal skin color and pigmentation [Skin Turgor] : normal skin turgor [] : no rash [No Focal Deficits] : no focal deficits [Oriented To Time, Place, And Person] : oriented to person, place, and time [Mood] : the mood was normal [Affect] : the affect was normal [Fingers] :  capillary refill of the fingers was normal [FreeTextEntry1] : deferred

## 2019-06-11 NOTE — DATA REVIEWED
[FreeTextEntry1] : CT Chest on 6/6/19: postop changes. Stable small nodules in the KIMBER ( 4mm and 2mm). PUlmonary emphysema.

## 2019-06-11 NOTE — HISTORY OF PRESENT ILLNESS
[FreeTextEntry1] : Ms. Lama is a 68 year old female who presents today for a follow up appointment. She is s/p Bronchoscopy, Right VATS, Right Lower Lobectomy, Hilar and Mediastinal Lymph Node Sampling on 1/8/2018, pathology revealed stage IA, T1bN0 invasive adenocarcinoma, solid predominant with additional minor acinar pattern. \par \par Chest CT Scan of 11/19/18 revealed stable post surgical changes, stable left basilar subsegmental atelectasis, stable 0.5 cm left apical nodule. In addition there are two (2) stable hepatic cyst measuring 0.9 and 1.2 cm. \par \par CT Chest on 6/6/19: postop changes. Stable small nodules in the KIMBER ( 4mm and 2mm). PUlmonary emphysema. \par \par She presents today for a followup visit. The patient denies SOB, cough, chest pain, hemoptysis, palpitation, fever, recent illness, hospitalization and significant weight loss.\par

## 2019-12-12 ENCOUNTER — APPOINTMENT (OUTPATIENT)
Dept: THORACIC SURGERY | Facility: CLINIC | Age: 69
End: 2019-12-12
Payer: MEDICARE

## 2019-12-12 VITALS
DIASTOLIC BLOOD PRESSURE: 83 MMHG | SYSTOLIC BLOOD PRESSURE: 164 MMHG | OXYGEN SATURATION: 98 % | HEART RATE: 74 BPM | HEIGHT: 63 IN | WEIGHT: 137 LBS | TEMPERATURE: 97.9 F | RESPIRATION RATE: 17 BRPM | BODY MASS INDEX: 24.27 KG/M2

## 2019-12-12 PROCEDURE — 99213 OFFICE O/P EST LOW 20 MIN: CPT

## 2019-12-12 RX ORDER — DOXYCYCLINE HYCLATE 100 MG/1
100 CAPSULE ORAL DAILY
Refills: 0 | Status: DISCONTINUED | COMMUNITY
Start: 2018-11-28 | End: 2019-12-12

## 2019-12-12 RX ORDER — PANTOPRAZOLE SODIUM 40 MG/1
40 TABLET, DELAYED RELEASE ORAL
Refills: 0 | Status: DISCONTINUED | COMMUNITY
End: 2019-12-12

## 2019-12-12 NOTE — PHYSICAL EXAM
[Sclera] : the sclera and conjunctiva were normal [Neck Appearance] : the appearance of the neck was normal [PERRL With Normal Accommodation] : pupils were equal in size, round, and reactive to light [] : no respiratory distress [Respiration, Rhythm And Depth] : normal respiratory rhythm and effort [Murmurs] : no murmurs [Heart Sounds] : normal S1 and S2 [Auscultation Breath Sounds / Voice Sounds] : lungs were clear to auscultation bilaterally [Edema] : there was no peripheral edema [Examination Of The Chest] : the chest was normal in appearance [Abdomen Tenderness] : non-tender [Cervical Lymph Nodes Enlarged Anterior Bilaterally] : anterior cervical [Cervical Lymph Nodes Enlarged Posterior Bilaterally] : posterior cervical [Abdomen Soft] : soft [Abnormal Walk] : normal gait [Supraclavicular Lymph Nodes Enlarged Bilaterally] : supraclavicular [No Focal Deficits] : no focal deficits [Skin Color & Pigmentation] : normal skin color and pigmentation [Skin Turgor] : normal skin turgor [Oriented To Time, Place, And Person] : oriented to person, place, and time [Affect] : the affect was normal [Mood] : the mood was normal

## 2019-12-16 NOTE — ASSESSMENT
[FreeTextEntry1] : 69 year old female, 6 month follow up, s/p Right VATS, Right Lower Lobectomy on 1/8/18, pathology revealed stage IA, T1bN0 invasive adenocarcinoma. Reports history of previous left lower lobectomy in 2000 for NSCLC.  CT Chest on 12/6/19 reveals stable nodules in the left upper lung, including 4 mm nodule and 1-2 mm nodules.\par \par I have reviewed the patient's medical records and diagnostic images during the time of this office visit, and I have made the following recommendation:  Return to office for follow up with CT Chest in 1 year.\par \par \par Written by Jahaira Goff NP, acting as a scribe for Mina Hart MD.\par \par The documentation recorded by the scribe accurately reflects the service I personally performed and the decisions made by me. MINA HART MD

## 2019-12-16 NOTE — CONSULT LETTER
[Courtesy Letter:] : I had the pleasure of seeing your patient, [unfilled], in my office today. [Please see my note below.] : Please see my note below. [Sincerely,] : Sincerely, [FreeTextEntry3] : \par \par \par Mina Alvarez MD, FACS \par , Division of Thoracic Surgery \par [facility] \par Chief, Thoracic Surgery \par [facility] \par Department of Cardiovascular & Thoracic Surgery \par  \par [facility] [FreeTextEntry2] : Dr. Sea Castaneda (Pulm/Ref)\par Dr. Nataliia Tse (PCP)\par Dr. Mahogany Elias (Dermatology) \par

## 2019-12-16 NOTE — HISTORY OF PRESENT ILLNESS
[FreeTextEntry1] : Ms. Lama is a 69 year old female who presents today for 6 month follow up. She is s/p Bronchoscopy, Right VATS, Right Lower Lobectomy, Hilar and Mediastinal Lymph Node Sampling on 1/8/2018, pathology revealed stage IA, T1bN0 invasive adenocarcinoma, solid predominant with additional minor acinar pattern. She reports history of previous left lower lobectomy in 2000 with  Dr. Murguia at [facility] for NSCLC.\par \par At previous visit in June 2019, CT Chest reviewed, which revealed stable small nodules in the KIMBER (4mm and 2mm).\par \par CT Chest on 12/6/19 reveals stable nodules in the left upper lung, including 4 mm nodule and 1-2 mm nodules.\par \par Overall, she reports feeling well and waking 1.5 miles with hills daily.  She denies any fever, chills, cough, shortness of breath, chest pain, hemoptysis, or recent illness.  Of note, she reports a poor appetite recently which she attributes to the death of her  3 weeks ago.

## 2019-12-25 PROBLEM — C34.90 NON-SMALL CELL CARCINOMA OF LUNG: Status: RESOLVED | Noted: 2017-12-07 | Resolved: 2019-12-25

## 2020-10-31 DIAGNOSIS — Z63.4 DISAPPEARANCE AND DEATH OF FAMILY MEMBER: ICD-10-CM

## 2020-10-31 DIAGNOSIS — Z81.8 FAMILY HISTORY OF OTHER MENTAL AND BEHAVIORAL DISORDERS: ICD-10-CM

## 2020-10-31 DIAGNOSIS — Z87.891 PERSONAL HISTORY OF NICOTINE DEPENDENCE: ICD-10-CM

## 2020-10-31 RX ORDER — LOSARTAN POTASSIUM AND HYDROCHLOROTHIAZIDE 12.5; 1 MG/1; MG/1
TABLET ORAL
Refills: 0 | Status: COMPLETED | COMMUNITY
End: 2020-10-31

## 2020-10-31 SDOH — SOCIAL STABILITY - SOCIAL INSECURITY: DISSAPEARANCE AND DEATH OF FAMILY MEMBER: Z63.4

## 2020-11-02 ENCOUNTER — APPOINTMENT (OUTPATIENT)
Dept: INTERNAL MEDICINE | Facility: CLINIC | Age: 70
End: 2020-11-02
Payer: MEDICARE

## 2020-11-02 ENCOUNTER — NON-APPOINTMENT (OUTPATIENT)
Age: 70
End: 2020-11-02

## 2020-11-02 VITALS
HEART RATE: 60 BPM | TEMPERATURE: 98.2 F | RESPIRATION RATE: 16 BRPM | WEIGHT: 138 LBS | HEIGHT: 63 IN | BODY MASS INDEX: 24.45 KG/M2 | OXYGEN SATURATION: 99 %

## 2020-11-02 VITALS — SYSTOLIC BLOOD PRESSURE: 118 MMHG | DIASTOLIC BLOOD PRESSURE: 60 MMHG

## 2020-11-02 DIAGNOSIS — Z87.891 PERSONAL HISTORY OF NICOTINE DEPENDENCE: ICD-10-CM

## 2020-11-02 DIAGNOSIS — Z86.010 PERSONAL HISTORY OF COLONIC POLYPS: ICD-10-CM

## 2020-11-02 DIAGNOSIS — Z20.3 CONTACT WITH AND (SUSPECTED) EXPOSURE TO RABIES: ICD-10-CM

## 2020-11-02 DIAGNOSIS — I73.00 RAYNAUD'S SYNDROME W/OUT GANGRENE: ICD-10-CM

## 2020-11-02 DIAGNOSIS — Z63.4 DISAPPEARANCE AND DEATH OF FAMILY MEMBER: ICD-10-CM

## 2020-11-02 DIAGNOSIS — Z80.3 FAMILY HISTORY OF MALIGNANT NEOPLASM OF BREAST: ICD-10-CM

## 2020-11-02 DIAGNOSIS — M25.511 PAIN IN RIGHT SHOULDER: ICD-10-CM

## 2020-11-02 PROCEDURE — G0438: CPT

## 2020-11-02 PROCEDURE — 36415 COLL VENOUS BLD VENIPUNCTURE: CPT

## 2020-11-02 PROCEDURE — 93000 ELECTROCARDIOGRAM COMPLETE: CPT

## 2020-11-02 RX ORDER — SENNOSIDES 8.6 MG
TABLET ORAL
Refills: 0 | Status: ACTIVE | COMMUNITY

## 2020-11-02 RX ORDER — MULTIVIT-MIN/FOLIC/VIT K/LYCOP 400-300MCG
25 MCG TABLET ORAL
Refills: 0 | Status: ACTIVE | COMMUNITY

## 2020-11-02 RX ORDER — VITAMIN B COMPLEX
CAPSULE ORAL
Refills: 0 | Status: ACTIVE | COMMUNITY

## 2020-11-02 RX ORDER — MELATONIN 5 MG
CAPSULE ORAL
Refills: 0 | Status: ACTIVE | COMMUNITY

## 2020-11-02 RX ORDER — DICLOFENAC SODIUM 50 MG/1
50 TABLET, DELAYED RELEASE ORAL 3 TIMES DAILY
Refills: 0 | Status: DISCONTINUED | COMMUNITY
End: 2020-11-02

## 2020-11-02 RX ORDER — DIETHYLTOLUAMIDE 7 %
SPRAY, NON-AEROSOL (ML) TOPICAL
Refills: 0 | Status: ACTIVE | COMMUNITY

## 2020-11-02 RX ORDER — DENOSUMAB 60 MG/ML
60 INJECTION SUBCUTANEOUS
Refills: 0 | Status: ACTIVE | COMMUNITY

## 2020-11-02 RX ORDER — ZINC SULFATE 50(220)MG
CAPSULE ORAL
Refills: 0 | Status: ACTIVE | COMMUNITY

## 2020-11-02 RX ORDER — MULTIVIT-MIN/FA/LYCOPEN/LUTEIN .4-300-25
TABLET ORAL
Refills: 0 | Status: ACTIVE | COMMUNITY

## 2020-11-02 SDOH — SOCIAL STABILITY - SOCIAL INSECURITY: DISSAPEARANCE AND DEATH OF FAMILY MEMBER: Z63.4

## 2020-11-02 NOTE — REASON FOR VISIT
Arrived via ems. From the [facility] in [facility]. Staff say pt fell at 7pm, unwittnesed fall, was awake and alert when found.  per paramedics. Normal baseline is A&Ox1. No bruising, redness, or lacerations noted.  Isolation precautions initiatedd per hospi
[FreeTextEntry1] : Pt here for check up

## 2020-11-02 NOTE — ASSESSMENT
[FreeTextEntry1] : all preventative measures reviewed and due for the following as outline in orders below.

## 2020-11-02 NOTE — PHYSICAL EXAM
[No Acute Distress] : no acute distress [Well Nourished] : well nourished [Well Developed] : well developed [Well-Appearing] : well-appearing [Normal Sclera/Conjunctiva] : normal sclera/conjunctiva [PERRL] : pupils equal round and reactive to light [EOMI] : extraocular movements intact [Normal Outer Ear/Nose] : the outer ears and nose were normal in appearance [Normal Oropharynx] : the oropharynx was normal [No JVD] : no jugular venous distention [No Lymphadenopathy] : no lymphadenopathy [Supple] : supple [Thyroid Normal, No Nodules] : the thyroid was normal and there were no nodules present [No Respiratory Distress] : no respiratory distress  [No Accessory Muscle Use] : no accessory muscle use [Clear to Auscultation] : lungs were clear to auscultation bilaterally [Normal Rate] : normal rate  [Regular Rhythm] : with a regular rhythm [Normal S1, S2] : normal S1 and S2 [No Murmur] : no murmur heard [No Carotid Bruits] : no carotid bruits [No Abdominal Bruit] : a ~M bruit was not heard ~T in the abdomen [No Varicosities] : no varicosities [Pedal Pulses Present] : the pedal pulses are present [No Edema] : there was no peripheral edema [No Palpable Aorta] : no palpable aorta [No Extremity Clubbing/Cyanosis] : no extremity clubbing/cyanosis [Soft] : abdomen soft [Non Tender] : non-tender [Non-distended] : non-distended [No Masses] : no abdominal mass palpated [No HSM] : no HSM [Normal Bowel Sounds] : normal bowel sounds [No Mass] : no mass [Normal Posterior Cervical Nodes] : no posterior cervical lymphadenopathy [Normal Anterior Cervical Nodes] : no anterior cervical lymphadenopathy [No CVA Tenderness] : no CVA  tenderness [No Spinal Tenderness] : no spinal tenderness [No Joint Swelling] : no joint swelling [Grossly Normal Strength/Tone] : grossly normal strength/tone [No Rash] : no rash [Coordination Grossly Intact] : coordination grossly intact [No Focal Deficits] : no focal deficits [Normal Gait] : normal gait [Normal Affect] : the affect was normal [Normal Insight/Judgement] : insight and judgment were intact [Stool Occult Blood] : stool negative for occult blood

## 2020-11-02 NOTE — HEALTH RISK ASSESSMENT
[] : Yes [30 or more] : 30 or more [No] : In the past 12 months have you used drugs other than those required for medical reasons? No [No falls in past year] : Patient reported no falls in the past year [0] : 2) Feeling down, depressed, or hopeless: Not at all (0) [Reports changes in dental health] : Reports changes in dental health [None] : None [Alone] : lives alone [Seat Belt] :  uses seat belt [Sunscreen] : uses sunscreen [Patient/Caregiver not ready to engage] : Patient/Caregiver not ready to engage [YearQuit] : 2000 [de-identified] : walking [Change in mental status noted] : No change in mental status noted [Reports changes in hearing] : Reports no changes in hearing [Reports changes in vision] : Reports no changes in vision [MammogramDate] : 01/20 [ColonoscopyDate] : 1/1/19 [ColonoscopyComments] : not clear needs to be repeated [FreeTextEntry3] :  2019 [de-identified] : 6/1/20

## 2020-11-03 LAB
25(OH)D3 SERPL-MCNC: 55.7 NG/ML
ALBUMIN SERPL ELPH-MCNC: 4.4 G/DL
ALP BLD-CCNC: 43 U/L
ALT SERPL-CCNC: 18 U/L
ANION GAP SERPL CALC-SCNC: 11 MMOL/L
APPEARANCE: CLEAR
AST SERPL-CCNC: 21 U/L
BACTERIA: NEGATIVE
BASOPHILS # BLD AUTO: 0.04 K/UL
BASOPHILS NFR BLD AUTO: 0.8 %
BILIRUB SERPL-MCNC: 0.5 MG/DL
BILIRUBIN URINE: NEGATIVE
BLOOD URINE: NEGATIVE
BUN SERPL-MCNC: 15 MG/DL
CALCIUM SERPL-MCNC: 9.5 MG/DL
CHLORIDE SERPL-SCNC: 100 MMOL/L
CHOLEST SERPL-MCNC: 232 MG/DL
CO2 SERPL-SCNC: 27 MMOL/L
COLOR: YELLOW
CREAT SERPL-MCNC: 0.83 MG/DL
EOSINOPHIL # BLD AUTO: 0.14 K/UL
EOSINOPHIL NFR BLD AUTO: 2.8 %
GLUCOSE QUALITATIVE U: NEGATIVE
GLUCOSE SERPL-MCNC: 91 MG/DL
HCT VFR BLD CALC: 37.1 %
HDLC SERPL-MCNC: 115 MG/DL
HGB BLD-MCNC: 12.1 G/DL
HYALINE CASTS: 0 /LPF
IMM GRANULOCYTES NFR BLD AUTO: 0.2 %
KETONES URINE: NEGATIVE
LDLC SERPL CALC-MCNC: 107 MG/DL
LEUKOCYTE ESTERASE URINE: NEGATIVE
LYMPHOCYTES # BLD AUTO: 1.32 K/UL
LYMPHOCYTES NFR BLD AUTO: 26.3 %
MAN DIFF?: NORMAL
MCHC RBC-ENTMCNC: 30.3 PG
MCHC RBC-ENTMCNC: 32.6 GM/DL
MCV RBC AUTO: 92.8 FL
MICROSCOPIC-UA: NORMAL
MONOCYTES # BLD AUTO: 0.45 K/UL
MONOCYTES NFR BLD AUTO: 9 %
NEUTROPHILS # BLD AUTO: 3.06 K/UL
NEUTROPHILS NFR BLD AUTO: 60.9 %
NITRITE URINE: NEGATIVE
NONHDLC SERPL-MCNC: 117 MG/DL
PH URINE: 6.5
PLATELET # BLD AUTO: 345 K/UL
POTASSIUM SERPL-SCNC: 3.8 MMOL/L
POTASSIUM SERPL-SCNC: 3.8 MMOL/L
PROT SERPL-MCNC: 6.7 G/DL
PROTEIN URINE: NEGATIVE
RBC # BLD: 4 M/UL
RBC # FLD: 13.7 %
RED BLOOD CELLS URINE: 3 /HPF
SODIUM SERPL-SCNC: 138 MMOL/L
SPECIFIC GRAVITY URINE: 1.01
SQUAMOUS EPITHELIAL CELLS: 0 /HPF
T4 SERPL-MCNC: 9.1 UG/DL
TRIGL SERPL-MCNC: 51 MG/DL
TSH SERPL-ACNC: 1.61 UIU/ML
UROBILINOGEN URINE: NORMAL
WBC # FLD AUTO: 5.02 K/UL
WHITE BLOOD CELLS URINE: 0 /HPF

## 2020-11-04 ENCOUNTER — NON-APPOINTMENT (OUTPATIENT)
Age: 70
End: 2020-11-04

## 2020-11-04 ENCOUNTER — TRANSCRIPTION ENCOUNTER (OUTPATIENT)
Age: 70
End: 2020-11-04

## 2020-11-06 NOTE — ASU PREOP CHECKLIST - NS PREOP CHK CHLOROHEX WASH
agree with resident note    "72 Y F H/O HTN, NIDDM II, HCL, no PMD visit in years with no medication use, presenting with weakness, immobilization X3 days, abdominal mass. States after voting on election night returned home, felt nauseous and laid on floor to vomit so as not to dirty her couch. After laying on floor felt too weak to stand. Stayed on floor with access to water, no food for 2-3 days. States abdominal mass has been present for 1 year with associated decreased weight. Did not followup with any PMD as she was caring for her  who  in august. + decreased BM. Denies any fever, chills, AMS, Abdominal Pain, Dysuria, Cough, CP, SOB."    PE: not toxic appearing; VSS; resting comfortably; dry oral mucosa; CTAB/L; s1 s2 no m/r/g abd: large midline fungating mass [id number] ext: no edema    Imp: fungating mass likely to be malignant; weakness, will check labs, EKG, trop; no complaints of fever, chest pain, decreased PO (other than time on ground)
N/A

## 2020-11-20 ENCOUNTER — TRANSCRIPTION ENCOUNTER (OUTPATIENT)
Age: 70
End: 2020-11-20

## 2020-12-16 ENCOUNTER — APPOINTMENT (OUTPATIENT)
Dept: THORACIC SURGERY | Facility: CLINIC | Age: 70
End: 2020-12-16
Payer: MEDICARE

## 2020-12-16 VITALS
RESPIRATION RATE: 17 BRPM | HEIGHT: 63 IN | HEART RATE: 78 BPM | BODY MASS INDEX: 24.45 KG/M2 | DIASTOLIC BLOOD PRESSURE: 88 MMHG | OXYGEN SATURATION: 98 % | SYSTOLIC BLOOD PRESSURE: 144 MMHG | WEIGHT: 138 LBS

## 2020-12-16 PROCEDURE — 99213 OFFICE O/P EST LOW 20 MIN: CPT

## 2020-12-17 NOTE — PHYSICAL EXAM
[Sclera] : the sclera and conjunctiva were normal [PERRL With Normal Accommodation] : pupils were equal in size, round, and reactive to light [Neck Appearance] : the appearance of the neck was normal [] : no respiratory distress [Respiration, Rhythm And Depth] : normal respiratory rhythm and effort [Auscultation Breath Sounds / Voice Sounds] : lungs were clear to auscultation bilaterally [Heart Sounds] : normal S1 and S2 [Murmurs] : no murmurs [Abdomen Soft] : soft [Abdomen Tenderness] : non-tender [Cervical Lymph Nodes Enlarged Posterior Bilaterally] : posterior cervical [Cervical Lymph Nodes Enlarged Anterior Bilaterally] : anterior cervical [Supraclavicular Lymph Nodes Enlarged Bilaterally] : supraclavicular [Abnormal Walk] : normal gait [Skin Color & Pigmentation] : normal skin color and pigmentation [Skin Turgor] : normal skin turgor [No Focal Deficits] : no focal deficits [Oriented To Time, Place, And Person] : oriented to person, place, and time [Affect] : the affect was normal [Mood] : the mood was normal

## 2020-12-18 NOTE — CONSULT LETTER
[Please see my note below.] : Please see my note below. [Consult Closing:] : Thank you very much for allowing me to participate in the care of this patient.  If you have any questions, please do not hesitate to contact me. [Sincerely,] : Sincerely, [Courtesy Letter:] : I had the pleasure of seeing your patient, [unfilled], in my office today. [FreeTextEntry2] : Dr. Sea Castaneda (Pulm/Ref)\par Dr. Nataliia Tse (PCP)\par Dr. Mahogany Elias (Dermatology) \par \par  \par  [FreeTextEntry3] : \par \par \par Mina Alvarez MD, FACS \par , Division of Thoracic Surgery \par [facility] \par Chief, Thoracic Surgery \par [facility] \par Department of Cardiovascular & Thoracic Surgery \par  \par [facility]\par [facility]\par [address]\par Oncology [address]\[place] [address]\par Tel: [phone]\par Fax: [phone]\par \par \par \par

## 2020-12-18 NOTE — HISTORY OF PRESENT ILLNESS
[FreeTextEntry1] : Ms. Lama is a 70 year old female who presents today for one year follow up. She is s/p Right VATS, RLLobectomy, Hilar and Mediastinal Lymph Node Sampling on 1/8/2018, pathology revealed stage IA, T1bN0 invasive adenocarcinoma, solid predominant with additional minor acinar pattern. She reports history of previous left lower lobectomy in 2000 with Dr. Murguia at [facility] for NSCLC.\par \par CT Chest on 6/62019 revealed stable small nodules in the KIMBER (4mm and 2mm).\par CT Chest on 12/6/19 revealed stable KIMBER nodules, including 4 mm nodule and 1-2 mm nodules.\par \par CT Chest on 12/4/2020 reveals:\par - Stable KIMBER 4 mm spiculated focus near the apex, associated with one or two mildly focally dilated bronchioles\par - No other focal focal lung lesions seen. lungs are grossly clear without focal infiltrate or nodule\par - Extensive fine emphysematous changes bilaterally, predominantly involving upper lungs\par - Minimal stable scattered postoperative fibrotic stranding.\par \par Overall, she reports feeling well and continues to walk nearly daily.  She denies any fever, chills, cough, shortness of breath, chest pain, hemoptysis, or recent illness.

## 2020-12-18 NOTE — ASSESSMENT
[FreeTextEntry1] : 70 year old female, follow up, s/p Right VATS, RLLobectomy, Hilar and Mediastinal Lymph Node Sampling on 1/8/2018, pathology revealed stage IA, T1bN0 invasive adenocarcinoma, solid predominant with additional minor acinar pattern. Pt eports prior history of LLLlobectomy in 2000 with Dr. Murguia for NSCLC.\par \par CT Chest on 12/4/2020 reveals:\par - Stable KIMBER 4 mm spiculated focus near the apex, associated with one or two mildly focally dilated bronchioles\par - No other focal focal lung lesions seen. lungs are grossly clear without focal infiltrate or nodule\par - Extensive fine emphysematous changes bilaterally, predominantly involving upper lungs\par - Minimal stable scattered postoperative fibrotic stranding.\par \par I have reviewed the patient's medical records and diagnostic images during the time of this office visit, and I have made the following recommendation:\par 1.  Return to office for follow up with CT Chest in 1 year\par \par \par I personally performed the services described in the documentation, reviewed the documentation recorded by the scribe in my presence and it accurately and completely records my words and actions.\par \par I, Jahaira Goff, am scribing for and the presence of MOLLY Sims the following sections HISTORY OF PRESENT ILLNESSES, PAST MEDICAL/FAMILY/SOCIAL HISTORY; REVIEW OF SYSTEMS; VITAL SIGNS; PHYSICAL EXAM; DISPOSITION.

## 2021-02-01 ENCOUNTER — RX RENEWAL (OUTPATIENT)
Age: 71
End: 2021-02-01

## 2021-02-02 ENCOUNTER — TRANSCRIPTION ENCOUNTER (OUTPATIENT)
Age: 71
End: 2021-02-02

## 2021-05-21 ENCOUNTER — RX RENEWAL (OUTPATIENT)
Age: 71
End: 2021-05-21

## 2021-06-05 ENCOUNTER — NON-APPOINTMENT (OUTPATIENT)
Age: 71
End: 2021-06-05

## 2021-06-07 ENCOUNTER — APPOINTMENT (OUTPATIENT)
Dept: INTERNAL MEDICINE | Facility: CLINIC | Age: 71
End: 2021-06-07
Payer: MEDICARE

## 2021-06-07 VITALS
SYSTOLIC BLOOD PRESSURE: 128 MMHG | DIASTOLIC BLOOD PRESSURE: 72 MMHG | WEIGHT: 136 LBS | OXYGEN SATURATION: 97 % | BODY MASS INDEX: 24.1 KG/M2 | HEIGHT: 63 IN | TEMPERATURE: 97.4 F | HEART RATE: 75 BPM

## 2021-06-07 DIAGNOSIS — R35.0 FREQUENCY OF MICTURITION: ICD-10-CM

## 2021-06-07 PROCEDURE — 99214 OFFICE O/P EST MOD 30 MIN: CPT | Mod: 25

## 2021-06-07 PROCEDURE — 36415 COLL VENOUS BLD VENIPUNCTURE: CPT

## 2021-06-07 RX ORDER — LOSARTAN POTASSIUM 25 MG/1
25 TABLET, FILM COATED ORAL DAILY
Qty: 90 | Refills: 1 | Status: DISCONTINUED | COMMUNITY
End: 2021-06-07

## 2021-06-07 NOTE — HISTORY OF PRESENT ILLNESS
[FreeTextEntry1] : Pt here for f/u on blood pressure , and thyroid and cholesterol [de-identified] : Pt olerates meds no headaches no dizziness

## 2021-06-09 ENCOUNTER — NON-APPOINTMENT (OUTPATIENT)
Age: 71
End: 2021-06-09

## 2021-06-09 LAB
ALBUMIN SERPL ELPH-MCNC: 4.5 G/DL
ALBUMIN SERPL ELPH-MCNC: 4.5 G/DL
ALP BLD-CCNC: 48 U/L
ALT SERPL-CCNC: 23 U/L
ANION GAP SERPL CALC-SCNC: 13 MMOL/L
APPEARANCE: CLEAR
AST SERPL-CCNC: 26 U/L
BACTERIA UR CULT: NORMAL
BACTERIA: NEGATIVE
BILIRUB DIRECT SERPL-MCNC: 0.1 MG/DL
BILIRUB INDIRECT SERPL-MCNC: 0.4 MG/DL
BILIRUB SERPL-MCNC: 0.5 MG/DL
BILIRUBIN URINE: NEGATIVE
BLOOD URINE: NEGATIVE
BUN SERPL-MCNC: 18 MG/DL
CALCIUM SERPL-MCNC: 9.5 MG/DL
CHLORIDE SERPL-SCNC: 98 MMOL/L
CO2 SERPL-SCNC: 26 MMOL/L
COLOR: COLORLESS
CREAT SERPL-MCNC: 0.83 MG/DL
GLUCOSE QUALITATIVE U: NEGATIVE
GLUCOSE SERPL-MCNC: 91 MG/DL
HYALINE CASTS: 0 /LPF
KETONES URINE: NEGATIVE
LEUKOCYTE ESTERASE URINE: NEGATIVE
MICROSCOPIC-UA: NORMAL
NITRITE URINE: NEGATIVE
PH URINE: 6.5
PHOSPHATE SERPL-MCNC: 4 MG/DL
POTASSIUM SERPL-SCNC: 3.6 MMOL/L
PROT SERPL-MCNC: 7 G/DL
PROTEIN URINE: NEGATIVE
RED BLOOD CELLS URINE: 1 /HPF
SODIUM SERPL-SCNC: 137 MMOL/L
SPECIFIC GRAVITY URINE: 1
SQUAMOUS EPITHELIAL CELLS: 0 /HPF
T4 SERPL-MCNC: 9 UG/DL
TSH SERPL-ACNC: 1.89 UIU/ML
UROBILINOGEN URINE: NORMAL
WHITE BLOOD CELLS URINE: 0 /HPF

## 2021-07-01 ENCOUNTER — RX RENEWAL (OUTPATIENT)
Age: 71
End: 2021-07-01

## 2021-08-29 ENCOUNTER — RX RENEWAL (OUTPATIENT)
Age: 71
End: 2021-08-29

## 2021-10-07 ENCOUNTER — APPOINTMENT (OUTPATIENT)
Dept: INTERNAL MEDICINE | Facility: CLINIC | Age: 71
End: 2021-10-07
Payer: MEDICARE

## 2021-10-07 VITALS
WEIGHT: 136 LBS | DIASTOLIC BLOOD PRESSURE: 76 MMHG | OXYGEN SATURATION: 99 % | BODY MASS INDEX: 24.1 KG/M2 | SYSTOLIC BLOOD PRESSURE: 124 MMHG | HEART RATE: 71 BPM | HEIGHT: 63 IN

## 2021-10-07 VITALS — SYSTOLIC BLOOD PRESSURE: 132 MMHG | DIASTOLIC BLOOD PRESSURE: 70 MMHG

## 2021-10-07 PROCEDURE — G0008: CPT

## 2021-10-07 PROCEDURE — 99213 OFFICE O/P EST LOW 20 MIN: CPT | Mod: 25

## 2021-10-07 PROCEDURE — 90662 IIV NO PRSV INCREASED AG IM: CPT

## 2021-10-25 ENCOUNTER — TRANSCRIPTION ENCOUNTER (OUTPATIENT)
Age: 71
End: 2021-10-25

## 2021-10-31 ENCOUNTER — TRANSCRIPTION ENCOUNTER (OUTPATIENT)
Age: 71
End: 2021-10-31

## 2021-10-31 LAB
ALBUMIN SERPL ELPH-MCNC: 4.1 G/DL
ALP BLD-CCNC: 38 U/L
ALT SERPL-CCNC: 21 U/L
ANION GAP SERPL CALC-SCNC: 11 MMOL/L
APPEARANCE: CLEAR
AST SERPL-CCNC: 23 U/L
BACTERIA: NEGATIVE
BASOPHILS # BLD AUTO: 0.05 K/UL
BASOPHILS NFR BLD AUTO: 1.1 %
BILIRUB SERPL-MCNC: 0.4 MG/DL
BILIRUBIN URINE: NEGATIVE
BLOOD URINE: NEGATIVE
BUN SERPL-MCNC: 16 MG/DL
CALCIUM SERPL-MCNC: 9.3 MG/DL
CHLORIDE SERPL-SCNC: 100 MMOL/L
CHOLEST SERPL-MCNC: 216 MG/DL
CO2 SERPL-SCNC: 27 MMOL/L
COLOR: NORMAL
CREAT SERPL-MCNC: 0.82 MG/DL
EOSINOPHIL # BLD AUTO: 0.17 K/UL
EOSINOPHIL NFR BLD AUTO: 3.6 %
ESTIMATED AVERAGE GLUCOSE: 114 MG/DL
GLUCOSE QUALITATIVE U: NEGATIVE
GLUCOSE SERPL-MCNC: 84 MG/DL
HBA1C MFR BLD HPLC: 5.6 %
HCT VFR BLD CALC: 37.8 %
HDLC SERPL-MCNC: 100 MG/DL
HGB BLD-MCNC: 12.3 G/DL
HYALINE CASTS: 0 /LPF
IMM GRANULOCYTES NFR BLD AUTO: 0.2 %
KETONES URINE: NEGATIVE
LDLC SERPL CALC-MCNC: 106 MG/DL
LEUKOCYTE ESTERASE URINE: NEGATIVE
LYMPHOCYTES # BLD AUTO: 1.15 K/UL
LYMPHOCYTES NFR BLD AUTO: 24.3 %
MAN DIFF?: NORMAL
MCHC RBC-ENTMCNC: 31.5 PG
MCHC RBC-ENTMCNC: 32.5 GM/DL
MCV RBC AUTO: 96.7 FL
MICROSCOPIC-UA: NORMAL
MONOCYTES # BLD AUTO: 0.5 K/UL
MONOCYTES NFR BLD AUTO: 10.6 %
NEUTROPHILS # BLD AUTO: 2.85 K/UL
NEUTROPHILS NFR BLD AUTO: 60.2 %
NITRITE URINE: NEGATIVE
NONHDLC SERPL-MCNC: 116 MG/DL
PH URINE: 6.5
PLATELET # BLD AUTO: 339 K/UL
POTASSIUM SERPL-SCNC: 3.9 MMOL/L
PROT SERPL-MCNC: 6.2 G/DL
PROTEIN URINE: NEGATIVE
RBC # BLD: 3.91 M/UL
RBC # FLD: 14.3 %
RED BLOOD CELLS URINE: 1 /HPF
SODIUM SERPL-SCNC: 138 MMOL/L
SPECIFIC GRAVITY URINE: 1.01
SQUAMOUS EPITHELIAL CELLS: 1 /HPF
T4 SERPL-MCNC: 7.8 UG/DL
TRIGL SERPL-MCNC: 48 MG/DL
TSH SERPL-ACNC: 5.35 UIU/ML
UROBILINOGEN URINE: NORMAL
WBC # FLD AUTO: 4.73 K/UL
WHITE BLOOD CELLS URINE: 0 /HPF

## 2021-11-03 ENCOUNTER — APPOINTMENT (OUTPATIENT)
Dept: INTERNAL MEDICINE | Facility: CLINIC | Age: 71
End: 2021-11-03
Payer: MEDICARE

## 2021-11-03 ENCOUNTER — NON-APPOINTMENT (OUTPATIENT)
Age: 71
End: 2021-11-03

## 2021-11-03 VITALS
WEIGHT: 134 LBS | SYSTOLIC BLOOD PRESSURE: 138 MMHG | HEIGHT: 63 IN | DIASTOLIC BLOOD PRESSURE: 82 MMHG | BODY MASS INDEX: 23.74 KG/M2 | OXYGEN SATURATION: 99 % | HEART RATE: 72 BPM

## 2021-11-03 VITALS — SYSTOLIC BLOOD PRESSURE: 118 MMHG | DIASTOLIC BLOOD PRESSURE: 80 MMHG

## 2021-11-03 DIAGNOSIS — E03.9 HYPOTHYROIDISM, UNSPECIFIED: ICD-10-CM

## 2021-11-03 DIAGNOSIS — R91.1 SOLITARY PULMONARY NODULE: ICD-10-CM

## 2021-11-03 DIAGNOSIS — R01.1 CARDIAC MURMUR, UNSPECIFIED: ICD-10-CM

## 2021-11-03 DIAGNOSIS — M85.80 OTHER SPECIFIED DISORDERS OF BONE DENSITY AND STRUCTURE, UNSPECIFIED SITE: ICD-10-CM

## 2021-11-03 DIAGNOSIS — Z00.00 ENCOUNTER FOR GENERAL ADULT MEDICAL EXAMINATION W/OUT ABNORMAL FINDINGS: ICD-10-CM

## 2021-11-03 DIAGNOSIS — I10 ESSENTIAL (PRIMARY) HYPERTENSION: ICD-10-CM

## 2021-11-03 DIAGNOSIS — I87.2 VENOUS INSUFFICIENCY (CHRONIC) (PERIPHERAL): ICD-10-CM

## 2021-11-03 DIAGNOSIS — G43.909 MIGRAINE, UNSPECIFIED, NOT INTRACTABLE, W/OUT STATUS MIGRAINOSUS: ICD-10-CM

## 2021-11-03 DIAGNOSIS — Z85.820 PERSONAL HISTORY OF MALIGNANT MELANOMA OF SKIN: ICD-10-CM

## 2021-11-03 PROCEDURE — G0444 DEPRESSION SCREEN ANNUAL: CPT

## 2021-11-03 PROCEDURE — 93000 ELECTROCARDIOGRAM COMPLETE: CPT | Mod: 59

## 2021-11-03 PROCEDURE — G0439: CPT

## 2021-11-03 RX ORDER — ASPIRIN 81 MG/1
81 TABLET, COATED ORAL DAILY
Refills: 0 | Status: DISCONTINUED | COMMUNITY
End: 2021-11-03

## 2021-11-03 RX ORDER — IRBESARTAN 75 MG/1
75 TABLET ORAL
Qty: 90 | Refills: 0 | Status: ACTIVE | COMMUNITY
Start: 2021-07-01 | End: 1900-01-01

## 2021-11-03 NOTE — HEALTH RISK ASSESSMENT
[No] : In the past 12 months have you used drugs other than those required for medical reasons? No [0] : 2) Feeling down, depressed, or hopeless: Not at all (0) [No falls in past year] : Patient reported no falls in the past year [PHQ-2 Negative - No further assessment needed] : PHQ-2 Negative - No further assessment needed [Alone] : lives alone [] :  [Fully functional (bathing, dressing, toileting, transferring, walking, feeding)] : Fully functional (bathing, dressing, toileting, transferring, walking, feeding) [Fully functional (using the telephone, shopping, preparing meals, housekeeping, doing laundry, using] : Fully functional and needs no help or supervision to perform IADLs (using the telephone, shopping, preparing meals, housekeeping, doing laundry, using transportation, managing medications and managing finances) [With Patient/Caregiver] : , with patient/caregiver [] : No [de-identified] : walking [de-identified] : reg [[id number]] : 0 [EyeExamDate] : 09/2021 [Change in mental status noted] : No change in mental status noted [Reports changes in dental health] : Reports no changes in dental health [Smoke Detector] : no smoke detector [Carbon Monoxide Detector] : no carbon monoxide detector [Seat Belt] : does not use seat belt [Sunscreen] : does not use sunscreen [MammogramDate] : 01/2021 [PapSmearDate] : 03/2021 [BoneDensityDate] : 2020 [AdvancecareDate] : 11/3/21

## 2021-12-13 PROBLEM — C34.91 ADENOCARCINOMA OF RIGHT LUNG: Status: ACTIVE | Noted: 2018-01-22

## 2021-12-15 ENCOUNTER — APPOINTMENT (OUTPATIENT)
Dept: THORACIC SURGERY | Facility: CLINIC | Age: 71
End: 2021-12-15
Payer: MEDICARE

## 2021-12-15 VITALS
SYSTOLIC BLOOD PRESSURE: 159 MMHG | HEART RATE: 87 BPM | RESPIRATION RATE: 18 BRPM | HEIGHT: 63.5 IN | OXYGEN SATURATION: 97 % | WEIGHT: 130 LBS | DIASTOLIC BLOOD PRESSURE: 75 MMHG | BODY MASS INDEX: 22.75 KG/M2

## 2021-12-15 DIAGNOSIS — C34.91 MALIGNANT NEOPLASM OF UNSPECIFIED PART OF RIGHT BRONCHUS OR LUNG: ICD-10-CM

## 2021-12-15 PROCEDURE — 99213 OFFICE O/P EST LOW 20 MIN: CPT

## 2021-12-17 NOTE — ASSESSMENT
[FreeTextEntry1] : 71 year old female, follow up, s/p Right VATS, RLLobectomy, Hilar and Mediastinal Lymph Node Sampling on 1/8/2018, pathology revealed stage IA, T1bN0 invasive adenocarcinoma, solid predominant with additional minor acinar pattern. Pt reports prior history of LLLobectomy in 2000 with Dr. Murguia for NSCLC.\par \par I have independently reviewed the patient's medical records and diagnostic images at time of this office visit. CT chest reviewed and explained to patient; stable scan. I recommended patient to return to office in 1 year with CT Chest without contrast. \par \par I personally performed the services described in the documentation, reviewed the documentation recorded by the scribe in my presence and it accurately and completely records my words and actions.\par \par I, COMFORT Izquierdo, am scribing for and in the presence of MOLLY Sims, the following sections HISTORY OF PRESENT ILLNESS, PAST MEDICAL/FAMILY/SOCIAL HISTORY; REVIEW OF SYSTEMS; VITAL SIGNS; PHYSICAL EXAM; DISPOSITION.\par    \par

## 2021-12-17 NOTE — HISTORY OF PRESENT ILLNESS
[FreeTextEntry1] : Ms. Lama is a 71 year old female who presents today for one year follow up. She is s/p Right VATS, RLLobectomy, Hilar and Mediastinal Lymph Node Sampling on 1/8/2018, pathology revealed stage IA, T1bN0 invasive adenocarcinoma, solid predominant with additional minor acinar pattern. She reports history of previous left lower lobectomy in 2000 with Dr. Murguia at [facility] for NSCLC.\par \par CT Chest on 12/4/2020 reveals:\par - Stable KIMBER 4 mm spiculated focus near the apex, associated with one or two mildly focally dilated bronchioles\par - No other focal focal lung lesions seen. lungs are grossly clear without focal infiltrate or nodule\par - Extensive fine emphysematous changes bilaterally, predominantly involving upper lungs\par - Minimal stable scattered postoperative fibrotic stranding.\par \par CT Chest on 12/8/21:\par - Stable 4 mm KIMBER pulm nodule since 12/4/20, 12/6/19\par \par Patient is here today for 1 year follow up. Patient denies worsening shortness of breath, cough, chest pain, fever, chills, unintentional weight loss, hemoptysis. She is moving to [place] today.

## 2021-12-17 NOTE — CONSULT LETTER
[Dear  ___] : Dear  [unfilled], [Courtesy Letter:] : I had the pleasure of seeing your patient, [unfilled], in my office today. [Please see my note below.] : Please see my note below. [Consult Closing:] : Thank you very much for allowing me to participate in the care of this patient.  If you have any questions, please do not hesitate to contact me. [Sincerely,] : Sincerely, [FreeTextEntry2] : Dr. Sea Castaneda (Pulm/Ref)\par Dr. Nataliia Tse (PCP)\par Dr. Mahogany Elias (Dermatology) \par \par  \par  [FreeTextEntry3] : Mina Alvarez MD, FACS \par , Thoracic Surgery, [facility]\par Chief of Division of Thoracic Surgery, [facility]\par Department of Cardiovascular & Thoracic Surgery \par , [facility] at [facility]\par  \par \par \par Mina Alvarez MD, FACS \par , Division of Thoracic Surgery \par [facility] \par Chief, Thoracic Surgery \par [facility] \par Department of Cardiovascular & Thoracic Surgery \par  \par [facility]\par [facility]\par [address]\par Oncology Building [facility]\[place] [address]\par Tel: [phone]\par Fax: [phone]\par \par \par \par

## 2021-12-30 ENCOUNTER — RX RENEWAL (OUTPATIENT)
Age: 71
End: 2021-12-30

## 2021-12-30 RX ORDER — HYDROCHLOROTHIAZIDE 25 MG/1
25 TABLET ORAL DAILY
Qty: 90 | Refills: 0 | Status: ACTIVE | COMMUNITY
Start: 2021-12-30 | End: 1900-01-01

## 2022-05-18 ENCOUNTER — RX RENEWAL (OUTPATIENT)
Age: 72
End: 2022-05-18

## 2022-05-18 RX ORDER — LEVOTHYROXINE SODIUM 0.07 MG/1
75 TABLET ORAL
Qty: 45 | Refills: 0 | Status: ACTIVE | COMMUNITY
Start: 2021-02-01 | End: 1900-01-01

## 2022-05-18 RX ORDER — LEVOTHYROXINE SODIUM 0.09 MG/1
88 TABLET ORAL
Qty: 45 | Refills: 0 | Status: ACTIVE | COMMUNITY
Start: 2021-08-29 | End: 1900-01-01

## 2023-08-11 ENCOUNTER — RX RENEWAL (OUTPATIENT)
Age: 73
End: 2023-08-11

## 2024-07-12 NOTE — ASU PATIENT PROFILE, ADULT - REASON FOR ADMISSION, PROFILE
GI follow up with Dr. Cross    Follow up with your primary care physician for further monitoring in 1-2 weeks. Please call to arrange appointment.  Pt stated that I am having Right lung surgery

## 2024-12-17 NOTE — PATIENT PROFILE ADULT. - AS SC BRADEN MOBILITY
Anesthesiologist present for case.  See Anesthesia Record for details regarding sedation/anesthesia, medications, and vital signs. (3) slightly limited

## 2025-06-18 NOTE — ASU PREOP CHECKLIST - HAND OFF
Copied from CRM #[id number]. Topic: MW Schedule Appointment  >> Jun 18, 2025 10:32 AM Jo BURRIS wrote:  --DO NOT REPLY - Sent from PACT - If sent to wrong pool, reroute to P ECO Reroute pool --    Reason for Appointment Message: Sooner appointment   Visit Request: Sooner appointment than what was offered   Message: mom would like to reschedule today's missed appointment with Dr. Thanh Paredes.   Preferred time to be seen: any time   Callback #: [phone]  Can a detailed message be left? Yes - Voicemail   Caller has been advised this message will be addressed within:2-3 business days [routine]    
Spoke with mother to assist with rescheduling appointment . Appointment scheduled for 6/25/25 .   
yes